# Patient Record
Sex: MALE | Race: BLACK OR AFRICAN AMERICAN | NOT HISPANIC OR LATINO | Employment: OTHER | URBAN - METROPOLITAN AREA
[De-identification: names, ages, dates, MRNs, and addresses within clinical notes are randomized per-mention and may not be internally consistent; named-entity substitution may affect disease eponyms.]

---

## 2019-12-26 ENCOUNTER — TRANSCRIBE ORDERS (OUTPATIENT)
Dept: ADMINISTRATIVE | Facility: HOSPITAL | Age: 54
End: 2019-12-26

## 2019-12-26 DIAGNOSIS — I69.359 HEMIPLEGIA, DOMINANT SIDE S/P CVA (CEREBROVASCULAR ACCIDENT) (HCC): ICD-10-CM

## 2019-12-26 DIAGNOSIS — G47.30 SLEEP APNEA, UNSPECIFIED TYPE: Primary | ICD-10-CM

## 2019-12-26 DIAGNOSIS — G47.00 INSOMNIA WITH SLEEP APNEA: ICD-10-CM

## 2019-12-26 DIAGNOSIS — G47.30 INSOMNIA WITH SLEEP APNEA: ICD-10-CM

## 2020-01-13 ENCOUNTER — HOSPITAL ENCOUNTER (OUTPATIENT)
Dept: NON INVASIVE DIAGNOSTICS | Facility: CLINIC | Age: 55
Discharge: HOME | End: 2020-01-13
Payer: MEDICARE

## 2020-01-13 DIAGNOSIS — I69.359 HEMIPLEGIA, DOMINANT SIDE S/P CVA (CEREBROVASCULAR ACCIDENT) (HCC): ICD-10-CM

## 2020-01-13 PROCEDURE — 93880 EXTRACRANIAL BILAT STUDY: CPT | Performed by: SURGERY

## 2020-01-13 PROCEDURE — 93880 EXTRACRANIAL BILAT STUDY: CPT

## 2020-01-16 ENCOUNTER — TRANSCRIBE ORDERS (OUTPATIENT)
Dept: INTERNAL MEDICINE CLINIC | Facility: CLINIC | Age: 55
End: 2020-01-16

## 2020-01-16 DIAGNOSIS — M21.372 LEFT FOOT DROP: Primary | ICD-10-CM

## 2020-01-17 ENCOUNTER — TRANSCRIBE ORDERS (OUTPATIENT)
Dept: INTERNAL MEDICINE CLINIC | Facility: CLINIC | Age: 55
End: 2020-01-17

## 2020-01-17 DIAGNOSIS — G62.9 NEUROPATHY: Primary | ICD-10-CM

## 2020-01-20 ENCOUNTER — CONSULT (OUTPATIENT)
Dept: MULTI SPECIALTY CLINIC | Facility: CLINIC | Age: 55
End: 2020-01-20

## 2020-01-20 VITALS
DIASTOLIC BLOOD PRESSURE: 82 MMHG | OXYGEN SATURATION: 99 % | HEART RATE: 83 BPM | TEMPERATURE: 98.4 F | SYSTOLIC BLOOD PRESSURE: 116 MMHG

## 2020-01-20 DIAGNOSIS — L85.3 XEROSIS CUTIS: ICD-10-CM

## 2020-01-20 DIAGNOSIS — E11.42 TYPE 2 DIABETES MELLITUS WITH DIABETIC POLYNEUROPATHY, WITHOUT LONG-TERM CURRENT USE OF INSULIN (HCC): ICD-10-CM

## 2020-01-20 DIAGNOSIS — B35.1 ONYCHOMYCOSIS: ICD-10-CM

## 2020-01-20 DIAGNOSIS — M21.372 LEFT FOOT DROP: Primary | ICD-10-CM

## 2020-01-20 PROCEDURE — 99203 OFFICE O/P NEW LOW 30 MIN: CPT | Performed by: PODIATRIST

## 2020-01-20 PROCEDURE — 11721 DEBRIDE NAIL 6 OR MORE: CPT | Performed by: PODIATRIST

## 2020-01-20 RX ORDER — AMMONIUM LACTATE 12 G/100G
LOTION TOPICAL 2 TIMES DAILY PRN
Qty: 400 G | Refills: 0 | Status: SHIPPED | OUTPATIENT
Start: 2020-01-20 | End: 2020-02-15

## 2020-01-20 RX ORDER — CLOPIDOGREL BISULFATE 75 MG/1
75 TABLET ORAL
COMMUNITY

## 2020-01-20 RX ORDER — AMLODIPINE BESYLATE AND BENAZEPRIL HYDROCHLORIDE 5; 20 MG/1; MG/1
CAPSULE ORAL
COMMUNITY
Start: 2019-12-23

## 2020-01-20 RX ORDER — GLIPIZIDE 10 MG/1
10 TABLET ORAL
COMMUNITY

## 2020-01-20 RX ORDER — ATORVASTATIN CALCIUM 40 MG/1
40 TABLET, FILM COATED ORAL DAILY
COMMUNITY
Start: 2019-12-23

## 2020-01-20 RX ORDER — ASPIRIN 81 MG/1
TABLET, COATED ORAL
COMMUNITY
Start: 2019-12-23

## 2020-01-20 RX ORDER — BLOOD-GLUCOSE METER
EACH MISCELLANEOUS
COMMUNITY
Start: 2019-12-30

## 2020-01-20 NOTE — PROGRESS NOTES
Podiatry Clinic Visit  Keena Parks 48 y o  male MRN: [id number]  Encounter: [id number]    Assessment/Plan        Diagnoses and all orders for this visit:    Left foot drop  -     Ambulatory [facility] Podiat[facility]  -     [facility]  -     Diabetic Shoe  -     Diabetic Shoe Inserts    Xerosis cutis  -     ammonium lactate (LAC-HYDRIN) 12 % lotion; Apply topically 2 (two) times a day as needed for dry skin    Type 2 diabetes mellitus with diabetic polyneuropathy, without long-term current use of insulin (HCC)  -     Diabetic Shoe  -     Diabetic Shoe Inserts           Plan:   Patient was seen and examined with all their questions and concerns addressed   DM foot risk assessment was performed, DM risk score 1   Nails x 10 were debrided in length and depth utilizing a nail nipper without incident to patients satisfaction   Rx for [facility] was printed off and handed to the patient along with a list of DME suppliers in the area   Rx for Dm shoes and inserts were printed off and handed to patient   Rx for Amlactin was electronically sent to a pharmacy of his choice   Patient was re-appointed for 2 months    - Dr Marylen Place was available/present for entirety of patient encounter and present for all procedures  History of Present Illness     HPI: Keena Parks is a 48 y o  male who presents as a referral from his PCP  He recently moved to the area from [place] where he was being followed by a podiatrist every 3 months routine nail care  He reports history of CVA with left-sided muscle weakness left foot drop  He also reports that he is a diabetic who is control his blood sugars with only medications  He states that his latest A1c test was 9 %  He denies any history pedal ulceration or calf claudication  They have no further podiatric complaints at this time  Review of Systems   Constitutional: Negative  HENT: Negative  Eyes: Negative  Respiratory: Negative  Cardiovascular: Negative  Gastrointestinal: Negative  Musculoskeletal: as noted in HPI  Skin: Elongated nails  Neurological: Negative  Historical Information   History reviewed  No pertinent past medical history  History reviewed  No pertinent surgical history  Social History   Social History     Substance and Sexual Activity   Alcohol Use Yes     Social History     Substance and Sexual Activity   Drug Use Never     Social History     Tobacco Use   Smoking Status Former Smoker   Smokeless Tobacco Never Used     Family History: History reviewed  No pertinent family history  Meds/Allergies     (Not in a hospital admission)  No Known Allergies    Objective     Current Vitals:   Blood Pressure: 116/82 (01/20/20 1443)  Pulse: 83 (01/20/20 1443)  Temperature: 98 4 °F (36 9 °C) (01/20/20 1443)  Temp Source: Temporal (01/20/20 1443)  SpO2: 99 % (01/20/20 1443)        /82 (BP Location: Left arm, Patient Position: Sitting, Cuff Size: Large)   Pulse 83   Temp 98 4 °F (36 9 °C) (Temporal)   SpO2 99%       Lower Extremity Exam:    Foot Exam    Musculoskeletal:  MMT is 3/5 to all compartments of the LLE, +0/4 edema B/L, Hallux limitus is present  Equinus is present  Decreased pedal arch noted b/l  Vascular:   DP pulses and PT pulses are present bilaterally  , CFT< 3sec to all digits  Pedal hair is Absent  regular rate and rhythm  Dermatological:  No open Lesions  Skin of the LE is normal texture, temperature, turgor  Interdigital maceration is not present  Elongated thick nails with sub ungual debris noted b/l  Dry pedal skin noted b/l  Neurologic:  Gross sensation is intact  Protective sensation is intact on the right diminished on the left  Vibratory sensation is intact and right diminished on the left  Sharp sensation is present on right diminished on the left

## 2020-02-12 ENCOUNTER — OFFICE VISIT (OUTPATIENT)
Dept: SLEEP CENTER | Facility: CLINIC | Age: 55
End: 2020-02-12
Payer: MEDICARE

## 2020-02-12 VITALS
BODY MASS INDEX: 40.43 KG/M2 | HEART RATE: 80 BPM | HEIGHT: 74 IN | SYSTOLIC BLOOD PRESSURE: 144 MMHG | DIASTOLIC BLOOD PRESSURE: 82 MMHG | WEIGHT: 315 LBS

## 2020-02-12 DIAGNOSIS — E66.9 DIABETES MELLITUS TYPE 2 IN OBESE (HCC): ICD-10-CM

## 2020-02-12 DIAGNOSIS — I10 ESSENTIAL HYPERTENSION: ICD-10-CM

## 2020-02-12 DIAGNOSIS — G47.33 OBSTRUCTIVE SLEEP APNEA SYNDROME: Primary | ICD-10-CM

## 2020-02-12 DIAGNOSIS — Z72.821 INADEQUATE SLEEP HYGIENE: ICD-10-CM

## 2020-02-12 DIAGNOSIS — Z86.73 HISTORY OF CVA (CEREBROVASCULAR ACCIDENT): ICD-10-CM

## 2020-02-12 DIAGNOSIS — E66.01 MORBID OBESITY WITH BMI OF 40.0-44.9, ADULT (HCC): ICD-10-CM

## 2020-02-12 DIAGNOSIS — E11.69 DIABETES MELLITUS TYPE 2 IN OBESE (HCC): ICD-10-CM

## 2020-02-12 DIAGNOSIS — R40.0 DAYTIME SLEEPINESS: ICD-10-CM

## 2020-02-12 PROCEDURE — 99204 OFFICE O/P NEW MOD 45 MIN: CPT | Performed by: INTERNAL MEDICINE

## 2020-02-12 NOTE — PROGRESS NOTES
Review of Systems      Genitourinary need to urinate more than twice a night   Cardiology ankle/leg swelling   Gastrointestinal none   Neurology need to move extremities, numbness/tingling of an extremity and balance problems   Constitutional fatigue   Integumentary none   Psychiatry anxiety   Musculoskeletal back pain and leg cramps   Pulmonary snoring and difficulty breathing when lying flat    ENT none   Endocrine excessive thirst   Hematological none

## 2020-02-12 NOTE — PROGRESS NOTES
Consultation - 86 [address] Good  48 y o  male  :1965  ETQ:[id number]    Physician Requesting Consult: Trevon Rodriguez,*     Reason for Consult : At your kind request I saw this patient for initial sleep evaluation today  Was diagnosed with obstructive sleep apnea in the past and prescribed CPAP  He did not initiate therapy because of insurance constraints  He is now here because of ongoing symptoms  Results of prior studies were not available  PFSH, Problem List, Medications & Allergies were reviewed in EMR  He  has no past medical history on file  He has a current medication list which includes the following prescription(s): amlodipine-benazepril, ammonium lactate, aspirin 81, atorvastatin, one touch ultra 2, clopidogrel, and glipizide  HPI:  He sleeps alone but is aware of snoring and awakens himself with snoring  He is not aware of breathing difficulties during sleep but notes snoring is worse when he is more tired  Other Complaints:  Constant fatigue  Restless Leg Syndrome: reports no suggestive symptoms but reports nocturnal leg cramps that awaken him    Parasomnia: no features reported    Sleep Routine:   Typical Bedtime:  11:00 p m  Gets OOB:  6:00 a m  TIB:7 hrs  Sleep latency: upto 60 minutes because he watches TV in bed  He denies racing thoughts of clock watching  Sleep Interruptions:2/nite because of nocturia is able to fall back asleep  Awakens: spontaneously  Upon awakening: never feels rested  He estimates getting 5 hrs sleep  He has Excessive Daytime Sleepiness , yawns, feels like napping but avoids  [place] Sleepiness Scale rated at Total score: 24  Habits: reports that he has never smoked  He has never used smokeless tobacco  , reports that he drinks alcohol  ,  reports that he does not use drugs  ,Caffeine use:rarely , Exercise routine: none   Family History: Negative for sleep disturbance  ROS: reviewed & as attached  Significant for intentional weight loss of around 40 lb in the past year  EXAM:  /82   Pulse 80   Ht 6' 2" (1 88 m)   Wt (!) 152 kg (336 lb)   BMI 43 14 kg/m²    General: Well groomed male, well appearing, in no apparent distress  Psychiatric: Alert and orientated; Cooperative; Speech:clear and coherent; Normal mood, affect & thought   HEENT:  Craniofacial anatomy: normal and macroglossia Sinuses: non- tender  TMJ: Normal    Eyes: EOM's intact;  conjunctiva/corneas clear   Ears: Externallyappear normal     Nasal Airway: is patent Septum:intact; Mucosa: normal; Turbinates: normal; Rhinorrhea: None   Mouth: Lips: normal posture; Dentition: normal   Mucosa:moist  ; Hard Palate:normal    Oropharryx: crowded and AP narrowing Tongue: Mallampati:Class IV and MobileSoft Palate:  redundant  Tonsils: no hypertrophy  Neck: is thick and excess fatty tissue; Neck Circumference: 19 "; Supple; no abnormal masses; Thyroid:normal  Trachea:central     Lymph: No Cervical or Submandibular Lymhadenopathy  Heart: S1,S2 normal; RRR; no gallop; nomurmurs   Lungs: Respiratory Effort:normal  Air entry good bilaterally  No wheezes  No rales  Abdomen: Obese, Soft & non-tender    Extremities: No pedal edema  No clubbing or cyanosis  Skin: warm and dry; Color& Hydration good; no facial rashes or lesions   Neurological: CNII-XII intact; Motor normal; Sensation normal  Musculoskeletal:  Left hemiplegia  Gait:  Hemiplegic with a cane  IMPRESSION: Primary, Secondary Sleep Diagnoses (to Medical or Psych conditions) & Comorbidities   1  Obstructive sleep apnea syndrome  Ambulatory referral to Sleep Medicine    Split Study   2  Daytime sleepiness  Split Study   3  Inadequate sleep hygiene     4  History of CVA (cerebrovascular accident)     5  Essential hypertension     6  Diabetes mellitus type 2 in obese ([facility] [address] )     7  Morbid obesity with BMI of 40 0-44 9, [facility] ([facility] )        PLAN:   1    Comprehensive counseling was provided on pathophysiology, diagnostic strategies & treatment options; effects on symptoms and comorbidities; risks of inadequate therapy; costs and insurance aspects  2  I advised on weight reduction, avoiding sleeping supine, using alcohol or sedating medications close to bed time and on safe driving practices  3  Cognitive behavioral therapy was initiated with advise on Sleep Hygiene and behavioral techniques to manage Insomnia  4  Nocturnal polysomnography is indicated and since he is willing to try CPAP a split study will be scheduled  6  Follow-up will be scheduled after the studies to review results, further details of treatment options and to initiate/adjust therapy  Thank you for allowing me to participate in the care of this patient  I will keep you apprised of developments      Sincerely,     Authenticated electronically by El Travis MD   on 49/47/22   Board Certified Specialist

## 2020-02-13 ENCOUNTER — HOSPITAL ENCOUNTER (OUTPATIENT)
Dept: SLEEP CENTER | Facility: CLINIC | Age: 55
Discharge: HOME | End: 2020-02-13
Payer: MEDICARE

## 2020-02-13 DIAGNOSIS — R40.0 DAYTIME SLEEPINESS: ICD-10-CM

## 2020-02-13 DIAGNOSIS — G47.33 OBSTRUCTIVE SLEEP APNEA SYNDROME: ICD-10-CM

## 2020-02-13 PROCEDURE — 95811 POLYSOM 6/>YRS CPAP 4/> PARM: CPT

## 2020-02-14 DIAGNOSIS — L85.3 XEROSIS CUTIS: ICD-10-CM

## 2020-02-14 DIAGNOSIS — G47.33 OSA (OBSTRUCTIVE SLEEP APNEA): Primary | ICD-10-CM

## 2020-02-14 NOTE — PROGRESS NOTES
Sleep Study Documentation    Pre-Sleep Study       Sleep testing procedure explained to patient:YES    Patient napped prior to study:NO    Caffeine:Dayshift worker after 12PM   Caffeine use:NO    Alcohol:Dayshift workers after 5PM: Alcohol use:NO    Typical day for patient:YES       Study Documentation    Sleep Study Indications: EMRE, Loud snoring, BMI>40, Nocturnal leg cramps, Daytime Sleepiness, History of CVA     Sleep Study: Split Optimal PAP pressure: 9 cm   Leak:Small  Snore:Eliminated  REM Obtained:yes  Supplemental O2: no    Minimum SaO2 88%  Baseline SaO2 97%  PAP mask tried (list all)  Size large ResMed AirFit F20 Full face mask   PAP mask choice (final)  Size large ResMed AirFit F20 full face mask   PAP mask type:full face  PAP pressure at which snoring was eliminated 9 cm   Minimum SaO2 at final PAP pressure 91%  Mode of Therapy:CPAP  TCPCO2:  YES due to BMI>40   CPAP changed to BiPAP:No    Mode of Therapy:CPAP    EKG abnormalities: yes:  EPOCH example and comments: Sinus Arrythmia throughout entire recording - please see epoch 751-124 for multiple P waves noted    EEG abnormalities: no    Sleep Study Recorded < 2 hours: N/A    Sleep Study Recorded > 2 hours but incomplete study: N/A    Sleep Study Recorded 6 hours but no sleep obtained: NO    Patient classification: disabled       Post-Sleep Study    Medication used at bedtime or during sleep study:NO    Patient reports time it took to fall asleep:greater than 60 minutes    Patient reports waking up during study:1 to 2 times  Patient reports returning to sleep without difficulty  Patient reports sleeping 4 to 6 hours without dreaming  Patient reports sleep during study:better than usual    Patient rated sleepiness: Not sleepy or tired    PAP treatment:yes: Post PAP treatment patient reports feeling better and  would wear PAP mask at home

## 2020-02-15 RX ORDER — AMMONIUM LACTATE 12 G/100G
CREAM TOPICAL
Qty: 385 G | Refills: 3 | Status: SHIPPED | OUTPATIENT
Start: 2020-02-15

## 2020-02-17 ENCOUNTER — TELEPHONE (OUTPATIENT)
Dept: SLEEP CENTER | Facility: CLINIC | Age: 55
End: 2020-02-17

## 2020-02-18 ENCOUNTER — OFFICE VISIT (OUTPATIENT)
Dept: GASTROENTEROLOGY | Facility: CLINIC | Age: 55
End: 2020-02-18
Payer: MEDICARE

## 2020-02-18 VITALS
TEMPERATURE: 96.7 F | DIASTOLIC BLOOD PRESSURE: 83 MMHG | HEART RATE: 77 BPM | WEIGHT: 315 LBS | HEIGHT: 74 IN | BODY MASS INDEX: 40.43 KG/M2 | SYSTOLIC BLOOD PRESSURE: 129 MMHG

## 2020-02-18 DIAGNOSIS — Z12.11 SCREENING FOR COLON CANCER: Primary | ICD-10-CM

## 2020-02-18 PROCEDURE — 99203 OFFICE O/P NEW LOW 30 MIN: CPT | Performed by: INTERNAL MEDICINE

## 2020-02-18 RX ORDER — POLYETHYLENE GLYCOL 3350 17 G/17G
POWDER, FOR SOLUTION ORAL
Qty: 238 G | Refills: 0 | Status: SHIPPED | OUTPATIENT
Start: 2020-02-18

## 2020-02-18 NOTE — PROGRESS NOTES
[facility] - Outpatient Consultation  Keena Parks 48 y o  male MRN: [id number]  Encounter: [id number]          ASSESSMENT AND PLAN:        1  Screening for colon cancer  - Colonoscopy; Future  - polyethylene glycol (GLYCOLAX) powder; At 5pm take 5mgx2 dulcolax  At 6pm 32oz miralax in 64oz gatorade  Drink 8oz glass every 5 mins until 32oz finished  Drink remaining as rec  Dispense: 238 g; Refill: 0    Will proceed with screening colonoscopy  The rationale for colonoscopy with possible biopsy, possible polypectomy, with IV sedation as well as all risks, benefits, and alternatives were discussed with the patient in detail  Risks including but not limited to perforation, bleeding, need for blood transfusion or emergent surgery, and missed neoplasm were reviewed in detail with the patient  The patient demonstrates full understanding and wishes to proceed with the colonoscopy   ______________________________________________________________    HPI:  Keena Parks is a 48y o  year old male who presents to the office for evaluation for colorectal cancer screening  Reported symptoms: none  Family history: no known risk factors  Previous colonoscopy: none    No blood in stool  No weight loss  No family history of colon cancer  No change in bowel habits  They are plavix for carotid artery stenosis  He has a history of sleep apnea, diabetes on glipizide well controlled, HTN, HLD  REVIEW OF SYSTEMS:    CONSTITUTIONAL: Denies any fever, chills, rigors, and weight loss  HEENT: No earache or tinnitus  Denies hearing loss or visual disturbances  CARDIOVASCULAR: No chest pain or palpitations  RESPIRATORY: Denies any cough, hemoptysis, shortness of breath or dyspnea on exertion  GASTROINTESTINAL: As noted in the History of Present Illness  GENITOURINARY: No problems with urination  Denies any hematuria or dysuria  NEUROLOGIC: No dizziness or vertigo, denies headaches  MUSCULOSKELETAL: Denies any muscle or joint pain  SKIN: Denies skin rashes or itching  ENDOCRINE: Denies excessive thirst  Denies intolerance to heat or cold  PSYCHOSOCIAL: Denies depression or anxiety  Denies any recent memory loss  Historical Information   History reviewed  No pertinent past medical history  History reviewed  No pertinent surgical history  Social History   Social History     Substance and Sexual Activity   Alcohol Use Yes    Comment: occ     Social History     Substance and Sexual Activity   Drug Use Never     Social History     Tobacco Use   Smoking Status Never Smoker   Smokeless Tobacco Never Used   Tobacco Comment    cigar     History reviewed  No pertinent family history  Meds/Allergies       Current Outpatient Medications:     amLODIPine-benazepril (LOTREL 5-20) 5-20 MG per capsule    ammonium lactate (LAC-HYDRIN) 12 % cream    ASPIRIN 81 81 MG EC tablet    atorvastatin (LIPITOR) 40 mg tablet    Blood Glucose Monitoring Suppl (ONE TOUCH ULTRA 2) w/Device KIT    clopidogrel (PLAVIX) 75 mg tablet    glipiZIDE (GLUCOTROL) 10 mg tablet    No Known Allergies        Objective     Blood pressure 129/83, pulse 77, temperature (!) 96 7 °F (35 9 °C), temperature source Tympanic, height 6' 2" (1 88 m), weight (!) 151 kg (332 lb)  Body mass index is 42 63 kg/m²  PHYSICAL EXAM:      General Appearance:   Alert, cooperative, no distress   HEENT:   Normocephalic, atraumatic, anicteric  Neck:  Supple, symmetrical, trachea midline   Lungs:   Clear to auscultation bilaterally; no rales, rhonchi or wheezing; respirations unlabored    Heart[de-identified]   Regular rate and rhythm; no murmur, rub, or gallop     Abdomen:   Soft, non-tender, non-distended; normal bowel sounds; no masses, no organomegaly    Genitalia:   Deferred    Rectal:   Deferred    Extremities:  No cyanosis, clubbing or edema    Pulses:  2+ and symmetric    Skin:  No jaundice, rashes, or lesions    Lymph nodes:  No palpable cervical lymphadenopathy        Lab Results:   No visits with results within 1 Day(s) from this visit  Latest known visit with results is:   No results found for any previous visit  Radiology Results:   No results found

## 2020-02-18 NOTE — PATIENT INSTRUCTIONS
Colonoscopy scheduled 4/2/20 with Dr Titi Millan at [facility]   Will B gave pt Miralax/Dulcolax instructions during OV

## 2020-03-13 ENCOUNTER — TELEPHONE (OUTPATIENT)
Dept: SLEEP CENTER | Facility: CLINIC | Age: 55
End: 2020-03-13

## 2020-03-31 ENCOUNTER — TELEPHONE (OUTPATIENT)
Dept: GASTROENTEROLOGY | Facility: CLINIC | Age: 55
End: 2020-03-31

## 2020-04-27 ENCOUNTER — TELEMEDICINE (OUTPATIENT)
Dept: SLEEP CENTER | Facility: CLINIC | Age: 55
End: 2020-04-27
Payer: MEDICARE

## 2020-04-27 ENCOUNTER — TELEPHONE (OUTPATIENT)
Dept: GASTROENTEROLOGY | Facility: AMBULARY SURGERY CENTER | Age: 55
End: 2020-04-27

## 2020-04-27 DIAGNOSIS — Z86.73 HISTORY OF CVA (CEREBROVASCULAR ACCIDENT): ICD-10-CM

## 2020-04-27 DIAGNOSIS — E66.01 MORBID OBESITY WITH BMI OF 40.0-44.9, ADULT (HCC): ICD-10-CM

## 2020-04-27 DIAGNOSIS — E11.69 DIABETES MELLITUS TYPE 2 IN OBESE (HCC): ICD-10-CM

## 2020-04-27 DIAGNOSIS — I10 ESSENTIAL HYPERTENSION: ICD-10-CM

## 2020-04-27 DIAGNOSIS — E66.9 DIABETES MELLITUS TYPE 2 IN OBESE (HCC): ICD-10-CM

## 2020-04-27 DIAGNOSIS — G47.33 OSA (OBSTRUCTIVE SLEEP APNEA): Primary | ICD-10-CM

## 2020-04-27 DIAGNOSIS — Z72.821 INADEQUATE SLEEP HYGIENE: ICD-10-CM

## 2020-04-27 DIAGNOSIS — R40.0 DAYTIME SLEEPINESS: ICD-10-CM

## 2020-04-27 PROCEDURE — 99214 OFFICE O/P EST MOD 30 MIN: CPT | Performed by: INTERNAL MEDICINE

## 2020-04-28 ENCOUNTER — TELEPHONE (OUTPATIENT)
Dept: SLEEP CENTER | Facility: CLINIC | Age: 55
End: 2020-04-28

## 2020-04-28 ENCOUNTER — HOSPITAL ENCOUNTER (OUTPATIENT)
Dept: GASTROENTEROLOGY | Facility: HOSPITAL | Age: 55
Setting detail: OUTPATIENT SURGERY
Discharge: HOME | End: 2020-04-28
Attending: INTERNAL MEDICINE

## 2020-06-22 ENCOUNTER — TELEPHONE (OUTPATIENT)
Dept: INTERNAL MEDICINE CLINIC | Facility: CLINIC | Age: 55
End: 2020-06-22

## 2020-07-20 ENCOUNTER — OFFICE VISIT (OUTPATIENT)
Dept: MULTI SPECIALTY CLINIC | Facility: CLINIC | Age: 55
End: 2020-07-20

## 2020-07-20 VITALS
BODY MASS INDEX: 40.43 KG/M2 | HEART RATE: 83 BPM | WEIGHT: 315 LBS | DIASTOLIC BLOOD PRESSURE: 86 MMHG | TEMPERATURE: 96 F | SYSTOLIC BLOOD PRESSURE: 144 MMHG | HEIGHT: 74 IN

## 2020-07-20 DIAGNOSIS — B35.1 ONYCHOMYCOSIS: ICD-10-CM

## 2020-07-20 DIAGNOSIS — E11.42 TYPE 2 DIABETES MELLITUS WITH PERIPHERAL NEUROPATHY (HCC): ICD-10-CM

## 2020-07-20 DIAGNOSIS — M21.372 FOOT DROP, LEFT FOOT: Primary | ICD-10-CM

## 2020-07-20 PROCEDURE — 11721 DEBRIDE NAIL 6 OR MORE: CPT | Performed by: PODIATRIST

## 2020-07-20 PROCEDURE — 99213 OFFICE O/P EST LOW 20 MIN: CPT | Performed by: PODIATRIST

## 2020-07-20 NOTE — PROGRESS NOTES
Podiatry Clinic Visit  Mendel Spar 50 y o  male MRN: [id number]  Encounter: [id number]    Assessment/Plan        Diagnoses and all orders for this visit:    Foot drop, left foot  -     Durable Medical Equipment    Type 2 diabetes mellitus with peripheral neuropathy ([facility] [address] )    Onychomycosis           Plan:   Patient was seen and examined with all their questions and concerns addressed   Patient was given another prescription for a MAFO, as he may have lost his previous perscription  He was educated on how to obtain this brace from a DME supplier   Patient's mycotic nails were sharply debrided x10 using a large nail nipper without incident  All devitalized and infected nail was removed and all subungual debris was cleared   Patient was re-appointed for 3 months but may call earlier if need be  - Dr Asha Oliver was available/present for entirety of patient encounter and present for all procedures  History of Present Illness     HPI: Mendel Spar is a 50 y o  male who presents complaining of elongated, thickened, discolored nails with subungual debris  He also says that his left foot is unable to move secondary to a previous stroke  He was given a prescription last appointment for a brace, but says that he has been unable to obtain this brace due to "car trouble " Patient denies nausea, vomiting, chest pain, shortness of breath, chills, fever  The patient has no further podiatric complaints at this time  Review of Systems   Constitutional: Negative  HENT: Negative  Eyes: Negative  Respiratory: Negative  Cardiovascular: Negative  Gastrointestinal: Negative  Musculoskeletal: negative  Skin: Mycotic toe nails  Neurological: Negative  Historical Information   History reviewed  No pertinent past medical history  History reviewed  No pertinent surgical history    Social History   Social History     Substance and Sexual Activity   Alcohol Use Yes    Comment: occ     Social History     Substance and Sexual Activity   Drug Use Never     Social History     Tobacco Use   Smoking Status Never Smoker   Smokeless Tobacco Never Used   Tobacco Comment    cigar     Family History: History reviewed  No pertinent family history  Meds/Allergies     (Not in a hospital admission)  No Known Allergies    Objective     Current Vitals:   Blood Pressure: 144/86 (07/20/20 1458)  Pulse: 83 (07/20/20 1458)  Temperature: (!) 96 °F (35 6 °C) (07/20/20 1458)  Temp Source: Temporal (07/20/20 1458)  Height: 6' 2" (188 cm) (07/20/20 1458)  Weight - Scale: (!) 153 kg (338 lb 3 oz) (07/20/20 1458)        /86 (BP Location: Right arm, Patient Position: Sitting, Cuff Size: Large)   Pulse 83   Temp (!) 96 °F (35 6 °C) (Temporal)   Ht 6' 2" (1 88 m)   Wt (!) 153 kg (338 lb 3 oz)   BMI 43 42 kg/m²       Lower Extremity Exam:    Foot Exam    Musculoskeletal:  MMT is 5/5 to all compartments of the right LE and 0/5 to all compartments of the left LE, +0/4 edema B/L, Hallux limitus is present  Equinus is present  No pain with passive ROM of pedal joints  Vascular:   DP pulses and PT pulses are present bilaterally  , CFT< 3sec to all digits  Pedal hair is Present  Pulse has regular rate and rhythm  Skin temperature warm to warm B/L  Dermatological:  No open Lesions  Skin of the LE is normal texture, temperature, turgor  Interdigital maceration is not present  Toenails 1-5 b/l are elongated, thickened, discolored, dystrophic, with subungual debris, indicative of onychomycosis  Neurologic:  Gross sensation is intact  Protective sensation is Intact  Vibratory sensation is Intact  Sharp sensation is present

## 2020-07-20 NOTE — PATIENT INSTRUCTIONS
Continue proper diabetic foot care; checking feet every day, wearing white socks, always wearing diabetic shoes even in house, tight glycemic control, proper low-sugar diet and exercise    Please obtain MAFO ankle brace (Molded ankle foot orthosis) from durable medical equipment supplier ([id number]: [facility]st[facility] Orth[facility]; [phone])

## 2020-08-13 ENCOUNTER — OFFICE VISIT (OUTPATIENT)
Dept: ENDOCRINOLOGY | Facility: CLINIC | Age: 55
End: 2020-08-13
Payer: MEDICARE

## 2020-08-13 VITALS
SYSTOLIC BLOOD PRESSURE: 144 MMHG | WEIGHT: 315 LBS | TEMPERATURE: 97.1 F | DIASTOLIC BLOOD PRESSURE: 86 MMHG | BODY MASS INDEX: 42.88 KG/M2 | HEART RATE: 84 BPM

## 2020-08-13 DIAGNOSIS — E11.65 TYPE 2 DIABETES MELLITUS WITH HYPERGLYCEMIA, WITHOUT LONG-TERM CURRENT USE OF INSULIN (HCC): Primary | ICD-10-CM

## 2020-08-13 DIAGNOSIS — G47.33 OBSTRUCTIVE SLEEP APNEA SYNDROME: ICD-10-CM

## 2020-08-13 DIAGNOSIS — I10 ESSENTIAL HYPERTENSION: ICD-10-CM

## 2020-08-13 DIAGNOSIS — E55.9 VITAMIN D DEFICIENCY: ICD-10-CM

## 2020-08-13 DIAGNOSIS — E78.2 MIXED HYPERLIPIDEMIA: ICD-10-CM

## 2020-08-13 PROCEDURE — 99204 OFFICE O/P NEW MOD 45 MIN: CPT | Performed by: INTERNAL MEDICINE

## 2020-08-13 RX ORDER — DULAGLUTIDE 0.75 MG/.5ML
0.75 INJECTION, SOLUTION SUBCUTANEOUS WEEKLY
Qty: 4 PEN | Refills: 3 | Status: SHIPPED | OUTPATIENT
Start: 2020-08-13 | End: 2021-01-13

## 2020-08-13 RX ORDER — METFORMIN HYDROCHLORIDE 500 MG/1
500 TABLET, EXTENDED RELEASE ORAL
Qty: 30 TABLET | Refills: 4 | Status: SHIPPED | OUTPATIENT
Start: 2020-08-13 | End: 2020-12-14

## 2020-08-13 NOTE — PATIENT INSTRUCTIONS
Check blood sugar twice daily, before breakfast and before dinner 1 day, and next day before lunch and at bedtime, and alternate every other day  Goal for blood sugar is 100 to 160 mg/dL  Send log every 2 weeks to make adjustment  Please schedule appointment with diabetes education  Stop Januvia once you start Trulicity inj       Hypoglycemia instructions   Ani Puentes  8/13/2020  [id number]    Low Blood Sugar    Steps to treat low blood sugar  1  Test blood sugar if you have symptoms of low blood sugar:   Low Blood Sugar Symptoms:  o Sweaty  o Dizzy  o Rapid heartbeat  o Shaky    o Bad mood  o Hungry      2  Treat blood sugar less than 70 with 15 grams of fast-acting carbohydrate:   Examples of 15 grams Fast-Acting Carbohydrate:  o 4 oz juice  o 4 oz regular soda  o 3-4 glucose tablets (chew)  o 3-4 hard candies (chew)              3    Wait 15 minutes and test your blood sugar again           4   If blood sugar is less than 100, repeat steps 2-3       5  When your blood sugar is 100 or more, eat a snack if it will be longer than one hour until your next meal  The snack should be 15 grams of carbohydrate and a protein:   Examples of snacks:  o ½ sandwich  o 6 crackers with cheese  o Piece of fruit with cheese or peanut butter  o 6 crackers with peanut butter

## 2020-08-13 NOTE — PROGRESS NOTES
Carter Sibley 50 y o  male MRN: [id number]    Encounter: [id number]      Assessment/Plan     Assessment: This is a 50y o -year-old male with diabetes with hyperglycemia  Plan:    Diagnoses and all orders for this visit:    Type 2 diabetes mellitus with hyperglycemia, without long-term current use of insulin ([facility])  A1c is more than 14%, suggestive of severe hyperglycemia  Discussed about starting insulin regimen, however patient is refusing to take insulin, he stated if his going to make changes in his diet and take his medications regularly and if does not improve blood sugars is agreeable to take insulin during his next appointment   He has agreed to take Trulicity 9 68 mg once a week, discussed to stop Januvia  Pen teaching was done in office  Discussed to check blood sugars 3 times daily, and send log in 2 weeks  Also discussed if blood sugar is consistently above 400, he should go to the ER  Discussed to start metformin 500 mg with breakfast, he is very sensitive to metformin, that is why we have started metformin extended release, if he continues to have diarrhea stopped  Will refer him for diabetes education, nutrition counseling  -     Dulaglutide (Trulicity) 9 43 YH/7 5XY SOPN; Inject 0 5 mL (0 75 mg total) under the skin once a week  -     Ambulatory referral to Diabetic Education; Future  -     metFORMIN (GLUCOPHAGE-XR) 500 mg 24 hr tablet; Take 1 tablet (500 mg total) by mouth daily with breakfast  -     Basic metabolic panel; Future  -     C-peptide; Future  -     Microalbumin / creatinine urine ratio; Future    Mixed hyperlipidemia    Continue statins  -     Lipid panel Lab Collect Lab Collect; Future    Essential hypertension  Continue current management for hypertension  Obstructive sleep apnea syndrome  Discussed importance of using CPAP machine regularly  Vitamin D deficiency  Start vitamin-D supplementation 4000 International Units daily  -     Vitamin D 25 hydroxy;  Future        CC: Diabetes    History of Present Illness     HPI:      Dana Dukes is 50 yr old gentleman with medical history of  type 2 diabetes for past 8 years, history of stroke, history of sleep apnea, hyperlipidemia and hypertension is here for establishment of visit for type 2 diabetes and management  Diabetes course over the years is unstable    Currently his taking Glucotrol 10 mg in the morning with breakfast   Patient admits noncompliance to diet  He stated he was treated with insulin previously however, now he is against taking insulin regimen  he was taking metformin previously however he had side effects such as stomach pains and diarrhea  He is currently taking Januvia 100 mg daily, denies side effects to Januvia  Check ing blood sugars in AM before breakfast   Blood sugars are in 140- 150 mg/dl as per patient report, however A1c in the office is more than 14%    He admits following low carb diet     Reviewed blood work results  Glucose 274, BUN 9 creatinine 0 94 sodium 136 potassium 4 4 calcium 9 0 bicarb 26 albumin 3 3 alkaline phosphatase 86 AST 17 ALT 29 anion gap is 7, GFR is 92    Wt Readings from Last 3 Encounters:   08/13/20 (!) 152 kg (334 lb)   07/20/20 (!) 153 kg (338 lb 3 oz)   02/18/20 (!) 151 kg (332 lb)       Review of Systems   Constitutional: Positive for activity change  Negative for diaphoresis, fatigue, fever and unexpected weight change  HENT: Negative  Eyes: Negative for visual disturbance  Respiratory: Negative for cough, chest tightness and shortness of breath  Cardiovascular: Negative for chest pain, palpitations and leg swelling  Gastrointestinal: Positive for diarrhea  Negative for abdominal pain, constipation, nausea and vomiting  Endocrine: Negative for cold intolerance, heat intolerance, polydipsia, polyphagia and polyuria  Genitourinary: Negative for dysuria, enuresis, frequency and urgency  Musculoskeletal: Positive for myalgias  Negative for arthralgias  Fermin Louis(Resident) Skin: Negative for pallor, rash and wound  Allergic/Immunologic: Negative  Neurological: Negative for dizziness, tremors, weakness and numbness  Hematological: Negative  Psychiatric/Behavioral: Negative  Historical Information   History reviewed  No pertinent past medical history  History reviewed  No pertinent surgical history  Social History   Social History     Substance and Sexual Activity   Alcohol Use Yes    Comment: occ     Social History     Substance and Sexual Activity   Drug Use Never     Social History     Tobacco Use   Smoking Status Never Smoker   Smokeless Tobacco Never Used   Tobacco Comment    cigar     Family History: History reviewed  No pertinent family history  Meds/Allergies   Current Outpatient Medications   Medication Sig Dispense Refill    amLODIPine-benazepril (LOTREL 5-20) 5-20 MG per capsule TAKE 1 CAPSULE BY ORAL ROUTE ONCE DAILY      ammonium lactate (LAC-HYDRIN) 12 % cream APPLY TOPICALLY 2 (TWO) TIMES A DAY AS NEEDED FOR DRY SKIN 385 g 3    ASPIRIN 81 81 MG EC tablet TAKE 1 TABLET (81 MG) BY ORAL ROUTE ONCE DAILY      atorvastatin (LIPITOR) 40 mg tablet Take 40 mg by mouth daily      Blood Glucose Monitoring Suppl (ONE TOUCH ULTRA 2) w/Device KIT TEST BLOOD SUGAR LEVELS ONE TIME A DAY      clopidogrel (PLAVIX) 75 mg tablet Take 75 mg by mouth      glipiZIDE (GLUCOTROL) 10 mg tablet Take 10 mg by mouth      polyethylene glycol (GLYCOLAX) powder At 5pm take 5mgx2 dulcolax  At 6pm 32oz miralax in 64oz gatorade  Drink 8oz glass every 5 mins until 32oz finished  Drink remaining as rec  238 g 0    Dulaglutide (Trulicity) 1 12 MX/5 7IT SOPN Inject 0 5 mL (0 75 mg total) under the skin once a week 4 pen 3    metFORMIN (GLUCOPHAGE-XR) 500 mg 24 hr tablet Take 1 tablet (500 mg total) by mouth daily with breakfast 30 tablet 4     No current facility-administered medications for this visit        No Known Allergies    Objective   Vitals: Blood pressure 144/86, pulse 84, temperature (!) 97 1 °F (36 2 °C), weight (!) 152 kg (334 lb)  Physical Exam  Vitals signs reviewed  Constitutional:       General: He is not in acute distress  Appearance: He is well-developed  HENT:      Head: Normocephalic and atraumatic  Mouth/Throat:      Mouth: Mucous membranes are moist    Eyes:      Extraocular Movements: Extraocular movements intact  Pupils: Pupils are equal, round, and reactive to light  Neck:      Musculoskeletal: Normal range of motion and neck supple  Thyroid: No thyromegaly  Cardiovascular:      Rate and Rhythm: Normal rate and regular rhythm  Heart sounds: Normal heart sounds  Pulmonary:      Effort: Pulmonary effort is normal  No respiratory distress  Breath sounds: Normal breath sounds  Abdominal:      General: There is no distension  Palpations: Abdomen is soft  Musculoskeletal: Normal range of motion  General: No deformity  Skin:     General: Skin is warm and dry  Findings: No erythema  Neurological:      Mental Status: He is alert and oriented to person, place, and time  The history was obtained from the review of the chart, patient  Lab Results:            Imaging Studies: I have personally reviewed pertinent reports  Portions of the record may have been created with voice recognition software  Occasional wrong word or "sound a like" substitutions may have occurred due to the inherent limitations of voice recognition software  Read the chart carefully and recognize, using context, where substitutions have occurred

## 2020-09-08 ENCOUNTER — TELEPHONE (OUTPATIENT)
Dept: GASTROENTEROLOGY | Facility: AMBULARY SURGERY CENTER | Age: 55
End: 2020-09-08

## 2020-09-20 PROBLEM — I73.9 PAD (PERIPHERAL ARTERY DISEASE) (HCC): Status: ACTIVE | Noted: 2020-09-20

## 2020-09-20 PROBLEM — R29.898 WEAKNESS OF EXTREMITY: Status: ACTIVE | Noted: 2020-09-20

## 2020-09-20 PROBLEM — E11.9 DM (DIABETES MELLITUS), TYPE 2 (HCC): Status: ACTIVE | Noted: 2020-09-20

## 2020-09-20 PROBLEM — E10.9 DIABETES MELLITUS TYPE 1 (HCC): Status: ACTIVE | Noted: 2020-09-20

## 2020-09-20 PROBLEM — I63.9 CVA (CEREBRAL VASCULAR ACCIDENT) (HCC): Status: ACTIVE | Noted: 2020-09-20

## 2020-09-20 PROBLEM — I65.29 CAROTID STENOSIS: Status: ACTIVE | Noted: 2020-09-20

## 2020-09-20 PROBLEM — E66.01 MORBID OBESITY WITH BMI OF 40.0-44.9, ADULT (HCC): Status: ACTIVE | Noted: 2020-09-20

## 2020-09-20 PROBLEM — E78.5 HYPERLIPIDEMIA: Status: ACTIVE | Noted: 2020-09-20

## 2020-09-21 ENCOUNTER — HOSPITAL ENCOUNTER (OUTPATIENT)
Dept: GASTROENTEROLOGY | Facility: AMBULARY SURGERY CENTER | Age: 55
Setting detail: OUTPATIENT SURGERY
Discharge: HOME | End: 2020-09-21
Attending: INTERNAL MEDICINE

## 2020-11-03 ENCOUNTER — OFFICE VISIT (OUTPATIENT)
Dept: MULTI SPECIALTY CLINIC | Facility: CLINIC | Age: 55
End: 2020-11-03

## 2020-11-03 VITALS
HEART RATE: 73 BPM | BODY MASS INDEX: 44.17 KG/M2 | SYSTOLIC BLOOD PRESSURE: 138 MMHG | TEMPERATURE: 96.7 F | WEIGHT: 315 LBS | DIASTOLIC BLOOD PRESSURE: 78 MMHG | OXYGEN SATURATION: 98 %

## 2020-11-03 DIAGNOSIS — B35.1 ONYCHOMYCOSIS: ICD-10-CM

## 2020-11-03 DIAGNOSIS — E11.65 UNCONTROLLED TYPE 2 DIABETES MELLITUS WITH HYPERGLYCEMIA (HCC): Primary | ICD-10-CM

## 2020-11-03 PROCEDURE — 99212 OFFICE O/P EST SF 10 MIN: CPT | Performed by: PODIATRIST

## 2020-11-03 PROCEDURE — 11721 DEBRIDE NAIL 6 OR MORE: CPT | Performed by: PODIATRIST

## 2020-11-05 DIAGNOSIS — E11.65 TYPE 2 DIABETES MELLITUS WITH HYPERGLYCEMIA, WITHOUT LONG-TERM CURRENT USE OF INSULIN (HCC): Primary | ICD-10-CM

## 2020-11-05 RX ORDER — PEN NEEDLE, DIABETIC 31 G X1/4"
NEEDLE, DISPOSABLE MISCELLANEOUS
Qty: 100 EACH | Refills: 0 | Status: SHIPPED | OUTPATIENT
Start: 2020-11-05 | End: 2020-11-30

## 2020-11-30 DIAGNOSIS — E11.65 TYPE 2 DIABETES MELLITUS WITH HYPERGLYCEMIA, WITHOUT LONG-TERM CURRENT USE OF INSULIN (HCC): ICD-10-CM

## 2020-11-30 RX ORDER — PEN NEEDLE, DIABETIC 31 G X1/4"
NEEDLE, DISPOSABLE MISCELLANEOUS
Qty: 100 EACH | Refills: 0 | Status: SHIPPED | OUTPATIENT
Start: 2020-11-30 | End: 2020-12-28

## 2020-12-12 DIAGNOSIS — E11.65 TYPE 2 DIABETES MELLITUS WITH HYPERGLYCEMIA, WITHOUT LONG-TERM CURRENT USE OF INSULIN (HCC): ICD-10-CM

## 2020-12-14 RX ORDER — METFORMIN HYDROCHLORIDE 500 MG/1
500 TABLET, EXTENDED RELEASE ORAL
Qty: 30 TABLET | Refills: 4 | Status: SHIPPED | OUTPATIENT
Start: 2020-12-14 | End: 2021-03-19

## 2020-12-26 DIAGNOSIS — E11.65 TYPE 2 DIABETES MELLITUS WITH HYPERGLYCEMIA, WITHOUT LONG-TERM CURRENT USE OF INSULIN (HCC): ICD-10-CM

## 2020-12-28 RX ORDER — PEN NEEDLE, DIABETIC 31 G X1/4"
NEEDLE, DISPOSABLE MISCELLANEOUS
Qty: 100 EACH | Refills: 0 | Status: SHIPPED | OUTPATIENT
Start: 2020-12-28 | End: 2021-02-19

## 2021-01-13 DIAGNOSIS — E11.65 TYPE 2 DIABETES MELLITUS WITH HYPERGLYCEMIA, WITHOUT LONG-TERM CURRENT USE OF INSULIN (HCC): ICD-10-CM

## 2021-01-13 RX ORDER — DULAGLUTIDE 0.75 MG/.5ML
0.75 INJECTION, SOLUTION SUBCUTANEOUS WEEKLY
Qty: 12 PEN | Refills: 0 | Status: SHIPPED | OUTPATIENT
Start: 2021-01-13 | End: 2021-01-14 | Stop reason: SDUPTHER

## 2021-01-14 DIAGNOSIS — E11.65 TYPE 2 DIABETES MELLITUS WITH HYPERGLYCEMIA, WITHOUT LONG-TERM CURRENT USE OF INSULIN (HCC): ICD-10-CM

## 2021-01-14 RX ORDER — DULAGLUTIDE 0.75 MG/.5ML
0.75 INJECTION, SOLUTION SUBCUTANEOUS WEEKLY
Qty: 4 PEN | Refills: 3 | Status: SHIPPED | OUTPATIENT
Start: 2021-01-14 | End: 2021-06-19

## 2021-01-14 NOTE — TELEPHONE ENCOUNTER
New prescription according to [facility] will only allow 4 pens per 28 days

## 2021-02-08 ENCOUNTER — TRANSCRIBE ORDERS (OUTPATIENT)
Dept: INTERNAL MEDICINE CLINIC | Facility: CLINIC | Age: 56
End: 2021-02-08

## 2021-02-08 DIAGNOSIS — E11.9 TYPE 2 DIABETES MELLITUS WITHOUT COMPLICATIONS (HCC): Primary | ICD-10-CM

## 2021-02-09 ENCOUNTER — OFFICE VISIT (OUTPATIENT)
Dept: MULTI SPECIALTY CLINIC | Facility: CLINIC | Age: 56
End: 2021-02-09

## 2021-02-09 VITALS — DIASTOLIC BLOOD PRESSURE: 93 MMHG | SYSTOLIC BLOOD PRESSURE: 142 MMHG | TEMPERATURE: 98.3 F | HEART RATE: 75 BPM

## 2021-02-09 DIAGNOSIS — E11.9 TYPE 2 DIABETES MELLITUS WITHOUT COMPLICATION, WITHOUT LONG-TERM CURRENT USE OF INSULIN (HCC): ICD-10-CM

## 2021-02-09 DIAGNOSIS — E11.9 TYPE 2 DIABETES MELLITUS WITHOUT COMPLICATIONS (HCC): Primary | ICD-10-CM

## 2021-02-09 DIAGNOSIS — B35.1 ONYCHOMYCOSIS: ICD-10-CM

## 2021-02-09 PROCEDURE — 99213 OFFICE O/P EST LOW 20 MIN: CPT | Performed by: PODIATRIST

## 2021-02-09 PROCEDURE — 11721 DEBRIDE NAIL 6 OR MORE: CPT | Performed by: PODIATRIST

## 2021-02-09 NOTE — PATIENT INSTRUCTIONS
Foot Care for People with Diabetes   WHAT YOU NEED TO KNOW:   · Foot care helps protect your feet and prevent foot ulcers or sores  Long-term high blood sugar levels can damage the blood vessels and nerves in your legs and feet  This damage makes it hard to feel pressure, pain, temperature, and touch  You may not be able to feel a cut or sore, or shoes that are too tight  Foot care is needed to prevent serious problems, such as an infection or amputation  · Diabetes may cause your toes to become crooked or curved under  These changes may affect the way you walk and can lead to increased pressure on your foot  The pressure can decrease blood flow to your feet  Lack of blood flow increases your risk for a foot ulcer  Do not ignore small problems, such as dry skin or small wounds  These can become life-threatening over time without proper care  DISCHARGE INSTRUCTIONS:   Call your care team provider if:   · Your feet become numb, weak, or hard to move  · You have pus draining from a sore on your foot  · You have a wound on your foot that gets bigger, deeper, or does not heal      · You see blisters, cuts, scratches, calluses, or sores on your foot  · You have a fever, and your feet become red, warm, and swollen  · Your toenails become thick, curled, or yellow  · You find it hard to check your feet because your vision is poor  · You have questions or concerns about your condition or care  Foot care:   · Check your feet each day  Look at your whole foot, including the bottom, and between and under your toes  Check for wounds, corns, and calluses  Use a mirror to see the bottom of your feet  The skin on your feet may be shiny, tight, or darker than normal  Your feet may also be cold and pale  Feel your feet by running your hands along the tops, bottoms, sides, and between your toes  Redness, swelling, and warmth are signs of blood flow problems that can lead to a foot ulcer   Do not try to remove corns or calluses yourself  · Wash your feet each day with soap and warm water  Do not use hot water, because this can injure your foot  Dry your feet gently with a towel after you wash them  Dry between and under your toes  · Apply lotion or a moisturizer on your dry feet  Ask your care team provider what lotions are best to use  Do not put lotion or moisturizer between your toes  Moisture between your toes could lead to skin breakdown  · Cut your toenails correctly  File or cut your toenails straight across  Use a soft brush to clean around your toenails  If your toenails are very thick, you may need to have a care team provider or specialist cut them  · Protect your feet  Do not walk barefoot or wear your shoes without socks  Check your shoes for rocks or other objects that can hurt your feet  Wear cotton socks to help keep your feet dry  Wear socks without toe seams, or wear them with the seams inside out  Change your socks each day  Do not wear socks that are dirty or damp  · Wear shoes that fit well  Wear shoes that do not rub against any area of your feet  Your shoes should be ½ to ¾ inch (1 to 2 centimeters) longer than your feet  Your shoes should also have extra space around the widest part of your feet  Walking or athletic shoes with laces or straps that adjust are best  Ask your care team provider for help to choose shoes that fit you best  Ask him or her if you need to wear an insert, orthotic, or bandage on your feet  · Do not smoke  Smoking can damage your blood vessels and put you at increased risk for foot ulcers  Ask your care team provider for information if you currently smoke and need help to quit  E-cigarettes or smokeless tobacco still contain nicotine  Talk to your care team provider before you use these products  Follow up with your diabetes care team provider or foot specialist as directed:   You will need to have your feet checked at least once a charleen Blanchard 25 may need a foot exam more often if you have nerve damage, foot deformities, or ulcers  Write down your questions so you remember to ask them during your visits  © Copyright [address] Information is for End User's use only and may not be sold, redistributed or otherwise used for commercial purposes  All illustrations and images included in CareNotes® are the copyrighted property of A D A M [employer] Inc  or [facility] Luis M Barclay   The above information is an  only  It is not intended as medical advice for individual conditions or treatments  Talk to your doctor, nurse or pharmacist before following any medical regimen to see if it is safe and effective for you

## 2021-02-09 NOTE — PROGRESS NOTES
At [address] [facility] 50 y o  male MRN: [id number]  Encounter: [id number]      Assessment/Plan        Diagnoses and all orders for this visit:    Type 2 diabetes mellitus without complications ([facility] [address] )  -     Ambulatory referral to Podiatry    Onychomycosis           Plan:   Patient was seen/examined  All questions and concerns addressed   Nails x10 were sharply trimmed to normal length and thickness with a large nail nipper without incident   Stressed the importance of glycemic control, shoe gear, and proper diabetic foot care   RTC in 3 month(s)    Dr Sadia Rodriguez was present during this entire procedure  History of Present Illness     HPI:  Bradly Sharp is a 50 y o  male who presents with elongated toenails and diabetic foot care    States that their nails are painful, elongated  They have difficulty applying their socks and shoes  The pressure within their shoe gear is painful and they have been unable to cut their nails adequately  Patient reports numbness/tingling  Pt denies any hx of wound to lower extremity  They state their last blood glucose level was 152mg/dL  Pt reports that he has left side weakness due to a stroke  The patient denies any nausea, vomiting, fever, chills, shortness of breath, or chest pains  Review of Systems   Constitutional: Negative  HENT: Negative  Eyes: Negative  Respiratory: Negative  Cardiovascular: Negative  Gastrointestinal: Negative  Musculoskeletal: Negative   Skin: elongated thickened toenails  Neurological: Negative  Historical Information   No past medical history on file  No past surgical history on file    Social History   Social History     Substance and Sexual Activity   Alcohol Use Yes    Comment: occ     Social History     Substance and Sexual Activity   Drug Use Never     Social History     Tobacco Use   Smoking Status Never Smoker   Smokeless Tobacco Never Used   Tobacco Comment    cigar     Family History: No family history on file  Meds/Allergies   (Not in a hospital admission)    No Known Allergies    Objective     Current Vitals:   Blood Pressure: 142/93 (02/09/21 1009)  Pulse: 75 (02/09/21 1009)  Temperature: 98 3 °F (36 8 °C) (02/09/21 1009)  Temp Source: Temporal (02/09/21 1009)        /93 (BP Location: Right arm, Patient Position: Sitting, Cuff Size: Large)   Pulse 75   Temp 98 3 °F (36 8 °C) (Temporal)       Lower Extremity Exam:    Diabetic Foot Exam    Musculoskeletal:  MMT is 5/5 to all compartments of the RLE, 3/5 to left lower extremity +2/4 edema B/L, Hallux limitus is present  Equinus is present  Vascular:   DP and PT are non palpable but biphasic by doppler  , CFT< 3sec to all digits  Pedal hair is Absent  Skin:  No open Lesions  Skin of the LE is normal texture, temperature, turgor  Interdigital maceration is not present  Nails elongated and thickened with subungual debris x10  Xerotic skin is noted of the dorsum and plantar surface of the feet bilaterally  Neurologic:  Gross sensation is intact  Protective sensation is Absent to left lower extremity, right is intact  Sharp sensation is present

## 2021-02-19 DIAGNOSIS — E11.65 TYPE 2 DIABETES MELLITUS WITH HYPERGLYCEMIA, WITHOUT LONG-TERM CURRENT USE OF INSULIN (HCC): ICD-10-CM

## 2021-02-19 RX ORDER — PEN NEEDLE, DIABETIC 31 G X1/4"
NEEDLE, DISPOSABLE MISCELLANEOUS
Qty: 100 EACH | Refills: 0 | Status: SHIPPED | OUTPATIENT
Start: 2021-02-19 | End: 2021-03-15

## 2021-03-13 DIAGNOSIS — E11.65 TYPE 2 DIABETES MELLITUS WITH HYPERGLYCEMIA, WITHOUT LONG-TERM CURRENT USE OF INSULIN (HCC): ICD-10-CM

## 2021-03-15 RX ORDER — BLOOD-GLUCOSE METER
EACH MISCELLANEOUS
Qty: 100 EACH | Refills: 0 | Status: SHIPPED | OUTPATIENT
Start: 2021-03-15 | End: 2021-03-23

## 2021-03-19 DIAGNOSIS — E11.65 TYPE 2 DIABETES MELLITUS WITH HYPERGLYCEMIA, WITHOUT LONG-TERM CURRENT USE OF INSULIN (HCC): ICD-10-CM

## 2021-03-19 RX ORDER — METFORMIN HYDROCHLORIDE 500 MG/1
500 TABLET, EXTENDED RELEASE ORAL
Qty: 30 TABLET | Refills: 4 | Status: SHIPPED | OUTPATIENT
Start: 2021-03-19

## 2021-03-23 DIAGNOSIS — E11.65 TYPE 2 DIABETES MELLITUS WITH HYPERGLYCEMIA, WITHOUT LONG-TERM CURRENT USE OF INSULIN (HCC): ICD-10-CM

## 2021-03-23 RX ORDER — PEN NEEDLE, DIABETIC 31 G X1/4"
NEEDLE, DISPOSABLE MISCELLANEOUS
Qty: 100 EACH | Refills: 0 | Status: SHIPPED | OUTPATIENT
Start: 2021-03-23 | End: 2021-05-08

## 2021-05-08 DIAGNOSIS — E11.65 TYPE 2 DIABETES MELLITUS WITH HYPERGLYCEMIA, WITHOUT LONG-TERM CURRENT USE OF INSULIN (HCC): ICD-10-CM

## 2021-05-08 RX ORDER — PEN NEEDLE, DIABETIC 31 G X1/4"
NEEDLE, DISPOSABLE MISCELLANEOUS
Qty: 100 EACH | Refills: 0 | Status: SHIPPED | OUTPATIENT
Start: 2021-05-08 | End: 2021-06-07

## 2021-05-10 ENCOUNTER — OFFICE VISIT (OUTPATIENT)
Dept: MULTI SPECIALTY CLINIC | Facility: CLINIC | Age: 56
End: 2021-05-10

## 2021-05-10 VITALS
BODY MASS INDEX: 40.43 KG/M2 | DIASTOLIC BLOOD PRESSURE: 104 MMHG | TEMPERATURE: 97.5 F | HEART RATE: 76 BPM | HEIGHT: 74 IN | SYSTOLIC BLOOD PRESSURE: 159 MMHG | WEIGHT: 315 LBS

## 2021-05-10 DIAGNOSIS — B35.1 ONYCHOMYCOSIS: ICD-10-CM

## 2021-05-10 DIAGNOSIS — E11.42 TYPE 2 DIABETES MELLITUS WITH DIABETIC POLYNEUROPATHY, WITHOUT LONG-TERM CURRENT USE OF INSULIN (HCC): Primary | ICD-10-CM

## 2021-05-10 DIAGNOSIS — B35.3 TINEA PEDIS OF BOTH FEET: ICD-10-CM

## 2021-05-10 PROCEDURE — 11721 DEBRIDE NAIL 6 OR MORE: CPT | Performed by: PODIATRIST

## 2021-05-10 PROCEDURE — 99213 OFFICE O/P EST LOW 20 MIN: CPT | Performed by: PODIATRIST

## 2021-05-10 RX ORDER — KETOCONAZOLE 20 MG/G
CREAM TOPICAL DAILY
Qty: 60 G | Refills: 1 | Status: SHIPPED | OUTPATIENT
Start: 2021-05-10

## 2021-05-10 NOTE — PATIENT INSTRUCTIONS
Diabetes and Your Skin   WHAT YOU NEED TO KNOW:   Diabetes can affect every part of your body, including your skin  Diabetes that is not well controlled can damage blood vessels and nerves  Damage to blood vessels can make it hard for blood to flow to tissues and organs  A lack of blood flow to your skin can cause ulcers that are difficult to heal  Skin ulcers are also called sores  People with diabetes can also have more bacterial skin infections than other people  Most skin conditions can be prevented with good blood sugar control  Skin sores can be hard to heal, or become life or limb-threatening, if not treated early  DISCHARGE INSTRUCTIONS:   Call your doctor or care team provider if:   · You get a severe burn or cut  · You have a sore that is painful, warm to the touch, or has redness around it  · Your sore does not get better or seems to get worse  · You have a fever  · Your blood sugar levels continue to be higher than they should be  · You have questions or concerns about your condition or care  Prevent skin sores:   · Keep your blood sugars within target range  Your care team provider will tell you what your blood sugar levels should be  High blood sugar levels increase your risk for skin infections and poor wound healing  · Keep your skin clean  Do not take hot baths or showers  They can cause your skin to get dry  Do not take a bubble bath if you have dry skin  Use moisturizing soaps  · Keep your skin from becoming too dry  Apply moisturizing lotion after baths or showers, especially in cold, dry weather  When you scratch dry, itchy skin, you can cause your skin to be open to infection  Bathe less during cold weather and use lotion to moisturize  Do not put lotion between your toes  Moisture between your toes could lead to skin breakdown  Use a humidifier to keep air in your home from being dry  · Keep areas where skin touches skin dry    Use talcum powder in areas such as armpits and groin  You may also need it under your breasts, and between your toes  Moisture in these areas can cause a fungal infection  · Treat cuts immediately  Clean minor cuts with soap and water  Cover them with sterile gauze  Follow up with your doctor or diabetes care team providers as directed:  Write down your questions so you remember to ask them during your visits  © Copyright [address] Information is for End User's use only and may not be sold, redistributed or otherwise used for commercial purposes  All illustrations and images included in CareNotes® are the copyrighted property of [employer]  or [address] [place] [address]  The above information is an  only  It is not intended as medical advice for individual conditions or treatments  Talk to your doctor, nurse or pharmacist before following any medical regimen to see if it is safe and effective for you

## 2021-05-10 NOTE — PROGRESS NOTES
At [address] [facility] 50 y o  male MRN: [id number]  Encounter: [id number]      Assessment/Plan        Diagnoses and all orders for this visit:    Type 2 diabetes mellitus with diabetic polyneuropathy, without long-term current use of insulin ([facility])    Tinea pedis of both feet  -     ketoconazole (NIZORAL) 2 % cream; Apply topically daily    Onychomycosis       Plan:   Patient was seen/examined  All questions and concerns addressed   Nails x10 were sharply trimmed to normal length and thickness with a large nail nipper without incident   Ketoconazole was prescribed for plantar foot tinea pedis   Stressed the importance of glycemic control, shoe gear, and proper diabetic foot care   RTC in 3 month(s)    Dr Alyssia Fulton was present during this entire procedure  History of Present Illness     HPI:  Sukumar Noble is a 50 y o  male who presents with elongated toenails and type 2 diabetes  States that their nails are painful, elongated  They have difficulty applying their socks and shoes  The pressure within their shoe gear is painful and they have been unable to cut their nails adequately  Patient admits to numbness/tingling, but states that it is improved since starting Lyrica  They state their last blood glucose level was "good"  The patient denies any nausea, vomiting, fever, chills, shortness of breath, or chest pains  Review of Systems   Constitutional: Negative  HENT: Negative  Eyes: Negative  Respiratory: Negative  Cardiovascular: Negative  Gastrointestinal: Negative  Musculoskeletal: Negative   Skin: elongated thickened toenails  Erythematous, peeling, scaly, pruritic pedal skin  Neurological: Negative  Historical Information   History reviewed  No pertinent past medical history  History reviewed  No pertinent surgical history    Social History   Social History     Substance and Sexual Activity   Alcohol Use Yes    Comment: occ     Social History Substance and Sexual Activity   Drug Use Never     Social History     Tobacco Use   Smoking Status Never Smoker   Smokeless Tobacco Never Used   Tobacco Comment    cigar     Family History: History reviewed  No pertinent family history  Meds/Allergies   (Not in a hospital admission)    No Known Allergies    Objective     Current Vitals:   Blood Pressure: (!) 159/104 (05/10/21 1319)  Pulse: 76 (05/10/21 1319)  Temperature: 97 5 °F (36 4 °C) (05/10/21 1319)  Temp Source: Temporal (05/10/21 1319)  Height: 6' 2" (188 cm) (05/10/21 1319)  Weight - Scale: (!) 154 kg (340 lb) (05/10/21 1319)  BP (!) 159/104 (BP Location: Right arm, Patient Position: Sitting, Cuff Size: Large)   Pulse 76   Temp 97 5 °F (36 4 °C) (Temporal)   Ht 6' 2" (1 88 m)   Wt (!) 154 kg (340 lb)   BMI 43 65 kg/m²     Lower Extremity Exam:    Diabetic Foot Exam    Musculoskeletal:  MMT is 4/5 to all compartments of the LE, +2/4 edema B/L, Hallux limitus is present  Equinus is present  Vascular:   R DP is +1/4, R PT is +1/4, L DP is +1/4, L PT is +1/4, CFT< 3sec to all digits  Pedal hair is Absent  regular rate and rhythm  Skin:  No open Lesions  Skin of the LE is normal texture, temperature, turgor  Interdigital maceration is not present  Nails elongated and thickened with subungual debris x10  Xerotic skin is noted of the plantar surface of the feet bilaterally  Plantar skin of the bilateral feet is erythematous, scaly, and peeling  This is consistent with tinea pedis  Neurologic:  Gross sensation is intact  Protective sensation is Diminished  Vibratory sensation is Intact  Sharp sensation is present

## 2021-06-02 DIAGNOSIS — B35.3 TINEA PEDIS OF BOTH FEET: ICD-10-CM

## 2021-06-02 RX ORDER — KETOCONAZOLE 20 MG/G
CREAM TOPICAL DAILY
Qty: 60 G | Refills: 1 | OUTPATIENT
Start: 2021-06-02

## 2021-06-02 NOTE — TELEPHONE ENCOUNTER
Patient should have one refill remaining  We will discuss further need for treatment at his next appointment

## 2021-06-05 DIAGNOSIS — E11.65 TYPE 2 DIABETES MELLITUS WITH HYPERGLYCEMIA, WITHOUT LONG-TERM CURRENT USE OF INSULIN (HCC): ICD-10-CM

## 2021-06-07 RX ORDER — BLOOD-GLUCOSE METER
EACH MISCELLANEOUS
Qty: 100 EACH | Refills: 0 | Status: SHIPPED | OUTPATIENT
Start: 2021-06-07

## 2021-06-19 DIAGNOSIS — E11.65 TYPE 2 DIABETES MELLITUS WITH HYPERGLYCEMIA, WITHOUT LONG-TERM CURRENT USE OF INSULIN (HCC): ICD-10-CM

## 2021-06-19 RX ORDER — DULAGLUTIDE 0.75 MG/.5ML
0.75 INJECTION, SOLUTION SUBCUTANEOUS WEEKLY
Qty: 2 ML | Refills: 3 | Status: SHIPPED | OUTPATIENT
Start: 2021-06-19 | End: 2021-09-17

## 2021-08-23 ENCOUNTER — OFFICE VISIT (OUTPATIENT)
Dept: MULTI SPECIALTY CLINIC | Facility: CLINIC | Age: 56
End: 2021-08-23

## 2021-08-23 VITALS — DIASTOLIC BLOOD PRESSURE: 78 MMHG | HEART RATE: 81 BPM | TEMPERATURE: 97.3 F | SYSTOLIC BLOOD PRESSURE: 129 MMHG

## 2021-08-23 DIAGNOSIS — E11.42 TYPE 2 DIABETES MELLITUS WITH DIABETIC POLYNEUROPATHY, WITHOUT LONG-TERM CURRENT USE OF INSULIN (HCC): Primary | ICD-10-CM

## 2021-08-23 DIAGNOSIS — B35.1 ONYCHOMYCOSIS: ICD-10-CM

## 2021-08-23 PROCEDURE — 11721 DEBRIDE NAIL 6 OR MORE: CPT | Performed by: PODIATRIST

## 2021-08-23 PROCEDURE — 99213 OFFICE O/P EST LOW 20 MIN: CPT | Performed by: PODIATRIST

## 2021-08-24 NOTE — PROGRESS NOTES
[address] 49 y o  male MRN: [id number]  Encounter: [id number]      Assessment/Plan        Diagnoses and all orders for this visit:    Type 2 diabetes mellitus with diabetic polyneuropathy, without long-term current use of insulin ([facility] [address] )    Onychomycosis         Plan:   Patient was seen/examined  All questions and concerns addressed   Nails x10 was sharply debrided to normal length thickness with large nail Nipper without incident   Patient states that tinea pedis is taking care of no need for more ketoconazole cream    Stressed the importance of glycemic control, shoe gear, and proper diabetic foot care   Educated patient on importance of edema control with compressive stockings   RTC in 3 month(s)    Dr Gregorio Hendricks was present during this entire procedure  History of Present Illness     HPI:  Patient states that he has had improvement of symptoms with ketoconazole cream the past few months  States that he done longer has the itchy feeling to the  Does state that since his stroke in 2017 the last of 3 strokes that he suffered from  He has had tingling in the left foot especially at nighttime  States that his where CT medication takes care of that and that he will follow up with his primary care provider to continue that medication  His only complaint or podiatry is long thick mycotic nails  He denies any history of ulceration foot, podiatric surgery or any other podiatric complaints this  Review of Systems   Constitutional: Negative  HENT: Negative  Eyes: Negative  Respiratory: Negative  Cardiovascular: Negative  Gastrointestinal: Negative  Musculoskeletal: limb swelling  Skin: onychomycosis  Neurological: Negative  Historical Information   History reviewed  No pertinent past medical history  History reviewed  No pertinent surgical history    Social History   Social History     Substance and Sexual Activity   Alcohol Use Yes    Comment: occ Social History     Substance and Sexual Activity   Drug Use Never     Social History     Tobacco Use   Smoking Status Never Smoker   Smokeless Tobacco Never Used   Tobacco Comment    cigar     Family History: History reviewed  No pertinent family history  Meds/Allergies   (Not in a hospital admission)    No Known Allergies    Objective     Current Vitals:   Blood Pressure: 129/78 (08/23/21 1439)  Pulse: 81 (08/23/21 1439)  Temperature: (!) 97 3 °F (36 3 °C) (08/23/21 1439)  Temp Source: Temporal (08/23/21 1439)        /78 (BP Location: Right arm, Patient Position: Sitting, Cuff Size: Large)   Pulse 81   Temp (!) 97 3 °F (36 3 °C) (Temporal)       Lower Extremity Exam:    Vascular: Right foot DP/PT +1                   Left foot DP/PT +1                   There is +1 lower extremity edema bilateral     Musculoskeletal: There is -4/5 strength throughout the left lower extremity  With 4/5 strength to the right lower extremity  limited ankle range of motion with well maintained subtalar range of motion  There is no tenderness over the foot and ankle  There is not foot deformities    Neurological: Sensation to 5 07 Thayne-Valeria nylon filament: negative bilaterally      Vibratory sense to distal Foot  negative bilaterally      Sharp/Dull sense is negative bilaterally      Dermatology: Skin Condition:  dryness and scaling     There is not evidence of macerated tissue between toe spaces  Nail Exam: onychomycosis of the toenails       Open ulcerations: No     Calluses: No

## 2021-10-13 ENCOUNTER — OFFICE VISIT (OUTPATIENT)
Dept: URGENT CARE | Age: 56
End: 2021-10-13
Payer: COMMERCIAL

## 2021-10-13 VITALS
SYSTOLIC BLOOD PRESSURE: 155 MMHG | RESPIRATION RATE: 16 BRPM | TEMPERATURE: 97.7 F | DIASTOLIC BLOOD PRESSURE: 92 MMHG | HEART RATE: 96 BPM | OXYGEN SATURATION: 100 %

## 2021-10-13 DIAGNOSIS — K08.89 PAIN, DENTAL: Primary | ICD-10-CM

## 2021-10-13 PROCEDURE — 99213 OFFICE O/P EST LOW 20 MIN: CPT | Performed by: FAMILY MEDICINE

## 2021-10-13 PROCEDURE — G0463 HOSPITAL OUTPT CLINIC VISIT: HCPCS | Performed by: FAMILY MEDICINE

## 2021-10-13 RX ORDER — SITAGLIPTIN 100 MG/1
TABLET, FILM COATED ORAL
COMMUNITY
Start: 2021-09-26

## 2021-10-13 RX ORDER — GLIPIZIDE 10 MG/1
TABLET, FILM COATED, EXTENDED RELEASE ORAL
COMMUNITY
Start: 2021-10-07

## 2021-10-13 RX ORDER — SPIRONOLACTONE AND HYDROCHLOROTHIAZIDE 25; 25 MG/1; MG/1
TABLET ORAL
COMMUNITY
Start: 2021-09-23

## 2021-10-13 RX ORDER — DOXYCYCLINE 100 MG/1
100 TABLET ORAL 2 TIMES DAILY
Qty: 20 TABLET | Refills: 0 | Status: SHIPPED | OUTPATIENT
Start: 2021-10-13 | End: 2021-10-23

## 2021-10-13 RX ORDER — PREGABALIN 75 MG/1
CAPSULE ORAL
COMMUNITY
Start: 2021-10-10

## 2021-11-29 ENCOUNTER — TELEPHONE (OUTPATIENT)
Dept: INTERNAL MEDICINE CLINIC | Facility: CLINIC | Age: 56
End: 2021-11-29

## 2023-06-05 ENCOUNTER — HOSPITAL ENCOUNTER (INPATIENT)
Facility: HOSPITAL | Age: 58
LOS: 5 days | Discharge: HOME | DRG: 417 | End: 2023-06-10
Attending: EMERGENCY MEDICINE | Admitting: STUDENT IN AN ORGANIZED HEALTH CARE EDUCATION/TRAINING PROGRAM
Payer: COMMERCIAL

## 2023-06-05 ENCOUNTER — APPOINTMENT (INPATIENT)
Dept: RADIOLOGY | Facility: HOSPITAL | Age: 58
DRG: 417 | End: 2023-06-05
Payer: COMMERCIAL

## 2023-06-05 DIAGNOSIS — R10.9 ABDOMINAL PAIN: Primary | ICD-10-CM

## 2023-06-05 DIAGNOSIS — K85.90 ACUTE PANCREATITIS: ICD-10-CM

## 2023-06-05 DIAGNOSIS — R11.2 NAUSEA AND VOMITING: ICD-10-CM

## 2023-06-05 DIAGNOSIS — K81.0 ACUTE CHOLECYSTITIS: ICD-10-CM

## 2023-06-05 PROBLEM — R10.13 EPIGASTRIC PAIN: Status: ACTIVE | Noted: 2023-06-05

## 2023-06-05 PROBLEM — I10 PRIMARY HYPERTENSION: Status: ACTIVE | Noted: 2023-06-05

## 2023-06-05 LAB
ALBUMIN SERPL BCP-MCNC: 3.3 G/DL (ref 3.5–5)
ALP SERPL-CCNC: 69 U/L (ref 46–116)
ALT SERPL W P-5'-P-CCNC: 27 U/L (ref 12–78)
AMYLASE SERPL-CCNC: 120 IU/L (ref 25–115)
ANION GAP SERPL CALCULATED.3IONS-SCNC: 2 MMOL/L (ref 4–13)
AST SERPL W P-5'-P-CCNC: 22 U/L (ref 5–45)
ATRIAL RATE: 80 BPM
BASOPHILS # BLD AUTO: 0.03 THOUSANDS/ÂΜL (ref 0–0.1)
BASOPHILS NFR BLD AUTO: 0 % (ref 0–1)
BILIRUB SERPL-MCNC: 0.34 MG/DL (ref 0.2–1)
BUN SERPL-MCNC: 12 MG/DL (ref 5–25)
CALCIUM ALBUM COR SERPL-MCNC: 9.5 MG/DL (ref 8.3–10.1)
CALCIUM SERPL-MCNC: 8.9 MG/DL (ref 8.3–10.1)
CHLORIDE SERPL-SCNC: 107 MMOL/L (ref 96–108)
CHOLEST SERPL-MCNC: 139 MG/DL
CO2 SERPL-SCNC: 24 MMOL/L (ref 21–32)
CREAT SERPL-MCNC: 1.04 MG/DL (ref 0.6–1.3)
EOSINOPHIL # BLD AUTO: 0.01 THOUSAND/ÂΜL (ref 0–0.61)
EOSINOPHIL NFR BLD AUTO: 0 % (ref 0–6)
ERYTHROCYTE [DISTWIDTH] IN BLOOD BY AUTOMATED COUNT: 12.9 % (ref 11.6–15.1)
GFR SERPL CREATININE-BSD FRML MDRD: 79 ML/MIN/1.73SQ M
GLUCOSE SERPL-MCNC: 191 MG/DL (ref 65–140)
GLUCOSE SERPL-MCNC: 191 MG/DL (ref 65–140)
GLUCOSE SERPL-MCNC: 217 MG/DL (ref 65–140)
HCT VFR BLD AUTO: 43 % (ref 36.5–49.3)
HDLC SERPL-MCNC: 38 MG/DL
HGB BLD-MCNC: 14.3 G/DL (ref 12–17)
IMM GRANULOCYTES # BLD AUTO: 0.04 THOUSAND/UL (ref 0–0.2)
IMM GRANULOCYTES NFR BLD AUTO: 0 % (ref 0–2)
LDLC SERPL CALC-MCNC: 90 MG/DL (ref 0–100)
LIPASE SERPL-CCNC: 450 U/L (ref 73–393)
LYMPHOCYTES # BLD AUTO: 1.26 THOUSANDS/ÂΜL (ref 0.6–4.47)
LYMPHOCYTES NFR BLD AUTO: 12 % (ref 14–44)
MCH RBC QN AUTO: 29 PG (ref 26.8–34.3)
MCHC RBC AUTO-ENTMCNC: 33.3 G/DL (ref 31.4–37.4)
MCV RBC AUTO: 87 FL (ref 82–98)
MONOCYTES # BLD AUTO: 0.36 THOUSAND/ÂΜL (ref 0.17–1.22)
MONOCYTES NFR BLD AUTO: 4 % (ref 4–12)
NEUTROPHILS # BLD AUTO: 8.64 THOUSANDS/ÂΜL (ref 1.85–7.62)
NEUTS SEG NFR BLD AUTO: 84 % (ref 43–75)
NRBC BLD AUTO-RTO: 0 /100 WBCS
P AXIS: 68 DEGREES
PLATELET # BLD AUTO: 206 THOUSANDS/UL (ref 149–390)
PMV BLD AUTO: 11 FL (ref 8.9–12.7)
POTASSIUM SERPL-SCNC: 4.3 MMOL/L (ref 3.5–5.3)
PR INTERVAL: 184 MS
PROT SERPL-MCNC: 8.3 G/DL (ref 6.4–8.4)
QRS AXIS: 68 DEGREES
QRSD INTERVAL: 98 MS
QT INTERVAL: 402 MS
QTC INTERVAL: 463 MS
RBC # BLD AUTO: 4.93 MILLION/UL (ref 3.88–5.62)
SODIUM SERPL-SCNC: 133 MMOL/L (ref 135–147)
T WAVE AXIS: 83 DEGREES
TRIGL SERPL-MCNC: 56 MG/DL
VENTRICULAR RATE: 80 BPM
WBC # BLD AUTO: 10.34 THOUSAND/UL (ref 4.31–10.16)

## 2023-06-05 PROCEDURE — 99285 EMERGENCY DEPT VISIT HI MDM: CPT | Performed by: EMERGENCY MEDICINE

## 2023-06-05 PROCEDURE — 80061 LIPID PANEL: CPT

## 2023-06-05 PROCEDURE — 94760 N-INVAS EAR/PLS OXIMETRY 1: CPT

## 2023-06-05 PROCEDURE — 96365 THER/PROPH/DIAG IV INF INIT: CPT

## 2023-06-05 PROCEDURE — 82948 REAGENT STRIP/BLOOD GLUCOSE: CPT

## 2023-06-05 PROCEDURE — 83690 ASSAY OF LIPASE: CPT

## 2023-06-05 PROCEDURE — 99285 EMERGENCY DEPT VISIT HI MDM: CPT

## 2023-06-05 PROCEDURE — 93010 ELECTROCARDIOGRAM REPORT: CPT | Performed by: INTERNAL MEDICINE

## 2023-06-05 PROCEDURE — 85025 COMPLETE CBC W/AUTO DIFF WBC: CPT

## 2023-06-05 PROCEDURE — 94660 CPAP INITIATION&MGMT: CPT

## 2023-06-05 PROCEDURE — 74177 CT ABD & PELVIS W/CONTRAST: CPT

## 2023-06-05 PROCEDURE — 36415 COLL VENOUS BLD VENIPUNCTURE: CPT

## 2023-06-05 PROCEDURE — 96375 TX/PRO/DX INJ NEW DRUG ADDON: CPT

## 2023-06-05 PROCEDURE — 80053 COMPREHEN METABOLIC PANEL: CPT

## 2023-06-05 PROCEDURE — 96366 THER/PROPH/DIAG IV INF ADDON: CPT

## 2023-06-05 PROCEDURE — 82150 ASSAY OF AMYLASE: CPT

## 2023-06-05 PROCEDURE — G1004 CDSM NDSC: HCPCS

## 2023-06-05 PROCEDURE — 99223 1ST HOSP IP/OBS HIGH 75: CPT | Performed by: STUDENT IN AN ORGANIZED HEALTH CARE EDUCATION/TRAINING PROGRAM

## 2023-06-05 PROCEDURE — 93005 ELECTROCARDIOGRAM TRACING: CPT

## 2023-06-05 RX ORDER — AMMONIUM LACTATE 12 G/100G
CREAM TOPICAL DAILY
Status: DISCONTINUED | OUTPATIENT
Start: 2023-06-05 | End: 2023-06-10 | Stop reason: HOSPADM

## 2023-06-05 RX ORDER — SODIUM CHLORIDE, SODIUM GLUCONATE, SODIUM ACETATE, POTASSIUM CHLORIDE, MAGNESIUM CHLORIDE, SODIUM PHOSPHATE, DIBASIC, AND POTASSIUM PHOSPHATE .53; .5; .37; .037; .03; .012; .00082 G/100ML; G/100ML; G/100ML; G/100ML; G/100ML; G/100ML; G/100ML
500 INJECTION, SOLUTION INTRAVENOUS ONCE
Status: COMPLETED | OUTPATIENT
Start: 2023-06-05 | End: 2023-06-05

## 2023-06-05 RX ORDER — CLOPIDOGREL BISULFATE 75 MG/1
75 TABLET ORAL DAILY
Status: DISCONTINUED | OUTPATIENT
Start: 2023-06-05 | End: 2023-06-10 | Stop reason: HOSPADM

## 2023-06-05 RX ORDER — ONDANSETRON 2 MG/ML
4 INJECTION INTRAMUSCULAR; INTRAVENOUS EVERY 6 HOURS PRN
Status: DISCONTINUED | OUTPATIENT
Start: 2023-06-05 | End: 2023-06-10 | Stop reason: HOSPADM

## 2023-06-05 RX ORDER — INSULIN LISPRO 100 [IU]/ML
2-12 INJECTION, SOLUTION INTRAVENOUS; SUBCUTANEOUS EVERY 6 HOURS SCHEDULED
Status: DISCONTINUED | OUTPATIENT
Start: 2023-06-05 | End: 2023-06-08

## 2023-06-05 RX ORDER — SPIRONOLACTONE AND HYDROCHLOROTHIAZIDE 25; 25 MG/1; MG/1
1 TABLET ORAL DAILY
Status: DISCONTINUED | OUTPATIENT
Start: 2023-06-05 | End: 2023-06-05

## 2023-06-05 RX ORDER — METOPROLOL TARTRATE 5 MG/5ML
2.5 INJECTION INTRAVENOUS EVERY 6 HOURS PRN
Status: DISCONTINUED | OUTPATIENT
Start: 2023-06-05 | End: 2023-06-10 | Stop reason: HOSPADM

## 2023-06-05 RX ORDER — LISINOPRIL 20 MG/1
20 TABLET ORAL DAILY
Status: DISCONTINUED | OUTPATIENT
Start: 2023-06-05 | End: 2023-06-10 | Stop reason: HOSPADM

## 2023-06-05 RX ORDER — SODIUM CHLORIDE, SODIUM GLUCONATE, SODIUM ACETATE, POTASSIUM CHLORIDE, MAGNESIUM CHLORIDE, SODIUM PHOSPHATE, DIBASIC, AND POTASSIUM PHOSPHATE .53; .5; .37; .037; .03; .012; .00082 G/100ML; G/100ML; G/100ML; G/100ML; G/100ML; G/100ML; G/100ML
100 INJECTION, SOLUTION INTRAVENOUS CONTINUOUS
Status: DISPENSED | OUTPATIENT
Start: 2023-06-05 | End: 2023-06-09

## 2023-06-05 RX ORDER — HYDROCHLOROTHIAZIDE 25 MG/1
25 TABLET ORAL DAILY
Status: DISCONTINUED | OUTPATIENT
Start: 2023-06-05 | End: 2023-06-10 | Stop reason: HOSPADM

## 2023-06-05 RX ORDER — SPIRONOLACTONE 25 MG/1
25 TABLET ORAL DAILY
Status: DISCONTINUED | OUTPATIENT
Start: 2023-06-05 | End: 2023-06-10 | Stop reason: HOSPADM

## 2023-06-05 RX ORDER — ACETAMINOPHEN 325 MG/1
650 TABLET ORAL EVERY 6 HOURS PRN
Status: DISCONTINUED | OUTPATIENT
Start: 2023-06-05 | End: 2023-06-06

## 2023-06-05 RX ORDER — ATORVASTATIN CALCIUM 40 MG/1
40 TABLET, FILM COATED ORAL DAILY
Status: DISCONTINUED | OUTPATIENT
Start: 2023-06-05 | End: 2023-06-10 | Stop reason: HOSPADM

## 2023-06-05 RX ORDER — MAGNESIUM HYDROXIDE/ALUMINUM HYDROXICE/SIMETHICONE 120; 1200; 1200 MG/30ML; MG/30ML; MG/30ML
30 SUSPENSION ORAL EVERY 6 HOURS PRN
Status: DISCONTINUED | OUTPATIENT
Start: 2023-06-05 | End: 2023-06-10 | Stop reason: HOSPADM

## 2023-06-05 RX ORDER — DOCUSATE SODIUM 100 MG/1
100 CAPSULE, LIQUID FILLED ORAL 2 TIMES DAILY
Status: DISCONTINUED | OUTPATIENT
Start: 2023-06-05 | End: 2023-06-10 | Stop reason: HOSPADM

## 2023-06-05 RX ORDER — ONDANSETRON 2 MG/ML
4 INJECTION INTRAMUSCULAR; INTRAVENOUS ONCE
Status: COMPLETED | OUTPATIENT
Start: 2023-06-05 | End: 2023-06-05

## 2023-06-05 RX ORDER — HYDROMORPHONE HCL/PF 1 MG/ML
0.5 SYRINGE (ML) INJECTION EVERY 4 HOURS PRN
Status: DISCONTINUED | OUTPATIENT
Start: 2023-06-05 | End: 2023-06-10 | Stop reason: HOSPADM

## 2023-06-05 RX ORDER — HYDROMORPHONE HCL/PF 1 MG/ML
0.5 SYRINGE (ML) INJECTION ONCE
Status: COMPLETED | OUTPATIENT
Start: 2023-06-05 | End: 2023-06-05

## 2023-06-05 RX ORDER — ENOXAPARIN SODIUM 100 MG/ML
40 INJECTION SUBCUTANEOUS DAILY
Status: DISCONTINUED | OUTPATIENT
Start: 2023-06-05 | End: 2023-06-10 | Stop reason: HOSPADM

## 2023-06-05 RX ORDER — OXYCODONE HYDROCHLORIDE 5 MG/1
5 TABLET ORAL EVERY 4 HOURS PRN
Status: DISCONTINUED | OUTPATIENT
Start: 2023-06-05 | End: 2023-06-10 | Stop reason: HOSPADM

## 2023-06-05 RX ORDER — OXYCODONE HYDROCHLORIDE 10 MG/1
10 TABLET ORAL EVERY 4 HOURS PRN
Status: DISCONTINUED | OUTPATIENT
Start: 2023-06-05 | End: 2023-06-10 | Stop reason: HOSPADM

## 2023-06-05 RX ORDER — SENNOSIDES 8.6 MG
1 TABLET ORAL DAILY
Status: DISCONTINUED | OUTPATIENT
Start: 2023-06-05 | End: 2023-06-10 | Stop reason: HOSPADM

## 2023-06-05 RX ORDER — INSULIN LISPRO 100 [IU]/ML
1-6 INJECTION, SOLUTION INTRAVENOUS; SUBCUTANEOUS
Status: DISCONTINUED | OUTPATIENT
Start: 2023-06-06 | End: 2023-06-08

## 2023-06-05 RX ORDER — AMLODIPINE BESYLATE 5 MG/1
5 TABLET ORAL DAILY
Status: DISCONTINUED | OUTPATIENT
Start: 2023-06-05 | End: 2023-06-10 | Stop reason: HOSPADM

## 2023-06-05 RX ORDER — PREGABALIN 75 MG/1
75 CAPSULE ORAL 2 TIMES DAILY
Status: DISCONTINUED | OUTPATIENT
Start: 2023-06-05 | End: 2023-06-10 | Stop reason: HOSPADM

## 2023-06-05 RX ADMIN — PREGABALIN 75 MG: 75 CAPSULE ORAL at 22:12

## 2023-06-05 RX ADMIN — HYDROMORPHONE HYDROCHLORIDE 0.5 MG: 1 INJECTION, SOLUTION INTRAMUSCULAR; INTRAVENOUS; SUBCUTANEOUS at 08:36

## 2023-06-05 RX ADMIN — HYDROMORPHONE HYDROCHLORIDE 0.5 MG: 1 INJECTION, SOLUTION INTRAMUSCULAR; INTRAVENOUS; SUBCUTANEOUS at 21:04

## 2023-06-05 RX ADMIN — OXYCODONE HYDROCHLORIDE 10 MG: 10 TABLET ORAL at 22:12

## 2023-06-05 RX ADMIN — METOPROLOL TARTRATE 2.5 MG: 1 INJECTION, SOLUTION INTRAVENOUS at 14:44

## 2023-06-05 RX ADMIN — SODIUM CHLORIDE, SODIUM GLUCONATE, SODIUM ACETATE, POTASSIUM CHLORIDE, MAGNESIUM CHLORIDE, SODIUM PHOSPHATE, DIBASIC, AND POTASSIUM PHOSPHATE 500 ML: .53; .5; .37; .037; .03; .012; .00082 INJECTION, SOLUTION INTRAVENOUS at 09:32

## 2023-06-05 RX ADMIN — AMLODIPINE BESYLATE 5 MG: 5 TABLET ORAL at 13:36

## 2023-06-05 RX ADMIN — DOCUSATE SODIUM 100 MG: 100 CAPSULE, LIQUID FILLED ORAL at 13:37

## 2023-06-05 RX ADMIN — ATORVASTATIN CALCIUM 40 MG: 40 TABLET, FILM COATED ORAL at 13:37

## 2023-06-05 RX ADMIN — ALUMINUM HYDROXIDE, MAGNESIUM HYDROXIDE, AND SIMETHICONE 30 ML: 200; 200; 20 SUSPENSION ORAL at 11:15

## 2023-06-05 RX ADMIN — HYDROCHLOROTHIAZIDE 25 MG: 25 TABLET ORAL at 13:31

## 2023-06-05 RX ADMIN — OXYCODONE HYDROCHLORIDE 10 MG: 10 TABLET ORAL at 13:32

## 2023-06-05 RX ADMIN — ACETAMINOPHEN 650 MG: 325 TABLET ORAL at 13:32

## 2023-06-05 RX ADMIN — SODIUM CHLORIDE, SODIUM GLUCONATE, SODIUM ACETATE, POTASSIUM CHLORIDE, MAGNESIUM CHLORIDE, SODIUM PHOSPHATE, DIBASIC, AND POTASSIUM PHOSPHATE 500 ML: .53; .5; .37; .037; .03; .012; .00082 INJECTION, SOLUTION INTRAVENOUS at 08:37

## 2023-06-05 RX ADMIN — ONDANSETRON 4 MG: 2 INJECTION INTRAMUSCULAR; INTRAVENOUS at 14:49

## 2023-06-05 RX ADMIN — LISINOPRIL 20 MG: 20 TABLET ORAL at 13:37

## 2023-06-05 RX ADMIN — HYDROMORPHONE HYDROCHLORIDE 0.5 MG: 1 INJECTION, SOLUTION INTRAMUSCULAR; INTRAVENOUS; SUBCUTANEOUS at 14:49

## 2023-06-05 RX ADMIN — INSULIN LISPRO 2 UNITS: 100 INJECTION, SOLUTION INTRAVENOUS; SUBCUTANEOUS at 17:52

## 2023-06-05 RX ADMIN — OXYCODONE HYDROCHLORIDE 10 MG: 10 TABLET ORAL at 17:52

## 2023-06-05 RX ADMIN — CLOPIDOGREL BISULFATE 75 MG: 75 TABLET ORAL at 13:37

## 2023-06-05 RX ADMIN — SODIUM CHLORIDE, SODIUM GLUCONATE, SODIUM ACETATE, POTASSIUM CHLORIDE, MAGNESIUM CHLORIDE, SODIUM PHOSPHATE, DIBASIC, AND POTASSIUM PHOSPHATE 100 ML/HR: .53; .5; .37; .037; .03; .012; .00082 INJECTION, SOLUTION INTRAVENOUS at 13:39

## 2023-06-05 RX ADMIN — SPIRONOLACTONE 25 MG: 25 TABLET ORAL at 13:37

## 2023-06-05 RX ADMIN — IOHEXOL 100 ML: 350 INJECTION, SOLUTION INTRAVENOUS at 19:33

## 2023-06-05 RX ADMIN — SENNOSIDES 8.6 MG: 8.6 TABLET, FILM COATED ORAL at 13:37

## 2023-06-05 RX ADMIN — PREGABALIN 75 MG: 75 CAPSULE ORAL at 13:37

## 2023-06-05 RX ADMIN — ONDANSETRON 4 MG: 2 INJECTION INTRAMUSCULAR; INTRAVENOUS at 08:36

## 2023-06-05 RX ADMIN — ASPIRIN 81 MG: 81 TABLET, COATED ORAL at 13:37

## 2023-06-05 RX ADMIN — ENOXAPARIN SODIUM 40 MG: 40 INJECTION SUBCUTANEOUS at 13:31

## 2023-06-05 NOTE — H&P
[facility]  H&P  Name: Chase Lubin 66 y o  male I MRN: [id number]  Unit/Bed#: [facility] 341-73 I Date of Admission: 6/5/2023   Date of Service: 6/5/2023  Hospital Day: 0      Assessment/Plan   * Epigastric pain  Assessment & Plan  · Patient presented with epigastric pain, radiating to back and lower abdomen associated with vomiting  Suspected pancreatitis however he does not meet 2 out of 3 criteria for pancreatitis, pending CT imaging  · Lipase and amylase slightly elevated above normal range  · Will order CT abdomen pelvis as he reports feeling bloated and is not able to pass gas, to rule out obstruction versus pancreatitis  · Will obtain lipid panel to rule out triglyceride induced pancreatitis  · Keep n p o  for now  · Continue IV fluids  · Conservative management with Zofran, Mylanta and pain control  Primary hypertension  Assessment & Plan  · Patient is on Aldactazide 25/25 and Lotrel  5-20 at home  · Resume same  · On presentation have been noted to have significantly elevated blood pressure likely due to pain  · We will order Lopressor as needed for systolic blood pressure above 180  DM (diabetes mellitus), type 2 [facility])  Assessment & Plan  Lab Results   Component Value Date    HGBA1C 7 0 (H) 04/06/2023       Recent Labs     06/05/23  0840 06/05/23  1551   POCGLU 217* 191*       Blood Sugar Average: Last 72 hrs:  (P) 204     · Patient is on metformin, glipizide and Januvia at home  · Will start on sliding scale insulin while inpatient  Can also start on Lantus once diet resumed  · Continue hypoglycemia protocol  · Continue Lyrica at home dosage  PAD (peripheral artery disease) ([facility] [address] )  Assessment & Plan  · Patient is on aspirin, Plavix and statin at home that he has stopped taking as he ran out of prescription    · Continue aspirin, Plavix and statin while in hospital     CVA (cerebral vascular accident) [facility])  Assessment & Plan  History noted, patient has stopped taking aspirin and statin at home  Resumed while in hospital, encouraged on compliance  Hyperlipidemia  Assessment & Plan  · Patient is on atorvastatin at home that he has stopped taking, will resume while inpatient  · He was encouraged on being compliant with his medications  Morbid obesity with BMI of 40 0-44 9, adult ([facility] [address] )  Assessment & Plan  · Patient's BMI 40  87  · Encouraged on lifestyle modification including diet changes and regular exercise, per patient he tries to exercise every day but have been noncompliant with diet changes  Obstructive sleep apnea syndrome  Assessment & Plan  CPAP at nighttime  VTE Pharmacologic Prophylaxis: VTE Score: 3 Moderate Risk (Score 3-4) - Pharmacological DVT Prophylaxis Ordered: enoxaparin (Lovenox)  Code Status: Level 1 - Full Code per patient  Discussion with family: Patient declined call to   As his son just left ED  Anticipated Length of Stay: Patient will be admitted on an inpatient basis with an anticipated length of stay of greater than 2 midnights secondary to Ongoing nausea vomiting, poor oral intake needing IV fluids, rule out pancreatitis  Total Time Spent on Date of Encounter in care of patient: 75 minutes This time was spent on one or more of the following: performing physical exam; counseling and coordination of care; obtaining or reviewing history; documenting in the medical record; reviewing/ordering tests, medications or procedures; communicating with other healthcare professionals and discussing with patient's family/caregivers  Chief Complaint: Abdominal pain    History of Present Illness:  Carolyn Eldridge is a 66 y o  male with a PMH of hyperlipidemia, peripheral vascular disease, CVA, diabetes and sleep apnea who presents with abdominal pain that started last night    According to patient he was in his usual state of health until last night when he started epigastric pain that gradually worsened over time, radiating to back and lower abdomen, associated with nausea and vomiting  Patient reports 4 episodes of nonbilious vomiting at home, is unable to keep anything p o  hence visited ED for further evaluation  His last alcohol intake was yesterday, reports drank only 1 shot  He smokes cigar almost every day  Patient has been noncompliant with Lipitor, aspirin and Plavix  He denies similar episodes in the past   Denies anyone in family having similar complaints  Has not dine out recently and denies eating anything unusual      On presentation to ED his blood pressure was noted to be 206/125 saturating well on room air, no signs of tachycardia or tachypnea  Labs remarkable for mild leukocytosis with WBC count of 10 34, lipase and amylase slightly elevated  Review of Systems:  Review of Systems   Constitutional: Positive for appetite change and fatigue  Gastrointestinal: Positive for abdominal pain, constipation, nausea and vomiting  Musculoskeletal: Positive for back pain  All other systems reviewed and are negative  Past Medical and Surgical History:   Past Medical History:   Diagnosis Date   • [facility]    • Hypertension    • Stroke [facility])        History reviewed  No pertinent surgical history  Meds/Allergies:  Prior to Admission medications    Medication Sig Start Date End Date Taking?  Authorizing Provider   amLODIPine-benazepril (LOTREL 5-20) 5-20 MG per capsule TAKE 1 CAPSULE BY ORAL ROUTE ONCE DAILY 12/23/19  Yes Historical Provider, MD   ammonium lactate (LAC-HYDRIN) 12 % cream APPLY TOPICALLY 2 (TWO) TIMES A DAY AS NEEDED FOR DRY SKIN 2/15/20   Johana Taylor DPM   ASPIRIN 81 81 MG EC tablet TAKE 1 TABLET (81 MG) BY ORAL ROUTE ONCE DAILY 12/23/19   Historical Provider, MD   atorvastatin (LIPITOR) 40 mg tablet Take 40 mg by mouth daily 12/23/19   Historical Provider, MD   Blood Glucose Monitoring Suppl (ONE TOUCH ULTRA 2) w/Device KIT TEST BLOOD SUGAR LEVELS ONE TIME A DAY 12/30/19   Historical Provider, MD   clopidogrel (PLAVIX) 75 mg tablet Take 75 mg by mouth    Historical Provider, MD   Easy Comfort Pen Needles 31G X 6 MM MISC USE WITH INSULIN PEN 6/7/21   Paulette Lam PA-C   glipiZIDE (GLUCOTROL XL) 10 mg 24 hr tablet TAKE 1 TABLET(S) EVERY DAY BY ORAL ROUTE WITH MEALS FOR 90 DAYS  10/7/21   Historical Provider, MD   glipiZIDE (GLUCOTROL) 10 mg tablet Take 10 mg by mouth    Historical Provider, MD   Januvia 100 MG tablet TAKE 1 TABLET(S) EVERY DAY BY ORAL ROUTE WITH MEALS  9/26/21   Historical Provider, MD   ketoconazole (NIZORAL) 2 % cream Apply topically daily 5/10/21   Byron Yun DPM   metFORMIN (GLUCOPHAGE-XR) 500 mg 24 hr tablet TAKE 1 TABLET (500 MG TOTAL) BY MOUTH DAILY WITH BREAKFAST 3/19/21   Arnold Marcum MD   polyethylene glycol (GLYCOLAX) powder At 5pm take 5mgx2 dulcolax  At 6pm 32oz miralax in 64oz gatorade  Drink 8oz glass every 5 mins until 32oz finished  Drink remaining as rec  Patient not taking: Reported on 5/10/2021 2/18/20   Urban Bailey MD   pregabalin (LYRICA) 75 mg capsule TAKE 1 CAPSULE 2 TIMES PER DAY 10/10/21   Historical Provider, MD   spironolactone-hydrochlorothiazide (ALDACTAZIDE) 25-25 mg per tablet TAKE 1 TABLET(S) EVERY DAY BY ORAL ROUTE IN THE MORNING   9/23/21   Historical Provider, MD   Trulicity 7 56 PAREKH/7 4YD SOPN INJECT 0 5 ML (0 75 MG TOTAL) UNDER THE SKIN ONCE A WEEK 9/17/21   Arnold Marcum MD     I have reviewed home medications with patient personally      Allergies: No Known Allergies    Social History:  Marital Status: Single   Occupation: employed  Patient Pre-hospital Living Situation: Home  Patient Pre-hospital Level of Mobility: walks  Patient Pre-hospital Diet Restrictions: no  Substance Use History:   Social History     Substance and Sexual Activity   Alcohol Use Yes    Comment: occ     Social History     Tobacco Use   Smoking Status Some Days   • Types: Cigars   Smokeless Tobacco Never "  Tobacco Comments    cigar     Social History     Substance and Sexual Activity   Drug Use Never       Family History:  History reviewed  No pertinent family history  Physical Exam:     Vitals:   Blood Pressure: (!) 188/102 (06/05/23 1432)  Pulse: 94 (06/05/23 1423)  Temperature: 97 8 °F (36 6 °C) (06/05/23 1423)  Temp Source: Tympanic (06/05/23 0841)  Respirations: (!) 24 (06/05/23 1423)  Height: 6' 2\" (188 cm) (06/05/23 1416)  Weight - Scale: (!) 144 kg (318 lb 5 5 oz) (06/05/23 1416)  SpO2: 97 % (06/05/23 1423)    Physical Exam  Constitutional:       Appearance: He is obese  HENT:      Head: Normocephalic and atraumatic  Right Ear: Tympanic membrane normal       Mouth/Throat:      Mouth: Mucous membranes are moist       Pharynx: Oropharynx is clear  Eyes:      Conjunctiva/sclera: Conjunctivae normal       Pupils: Pupils are equal, round, and reactive to light  Cardiovascular:      Rate and Rhythm: Normal rate and regular rhythm  Pulses: Normal pulses  Heart sounds: Normal heart sounds  Pulmonary:      Effort: Pulmonary effort is normal       Breath sounds: Normal breath sounds  Abdominal:      General: Abdomen is flat  There is distension  Tenderness: There is abdominal tenderness  Comments: Tenderness elicited on palpation of epigastrium and left lower quadrant  Musculoskeletal:         General: Normal range of motion  Skin:     General: Skin is warm and dry  Neurological:      General: No focal deficit present  Mental Status: He is alert and oriented to person, place, and time     Psychiatric:         Mood and Affect: Mood normal          Behavior: Behavior normal           Additional Data:     Lab Results:  Results from last 7 days   Lab Units 06/05/23  0838   EOS PCT % 0   HEMATOCRIT % 43 0   HEMOGLOBIN g/dL 14 3   LYMPHS PCT % 12*   MONOS PCT % 4   NEUTROS PCT % 84*   PLATELETS Thousands/uL 206   WBC Thousand/uL 10 34*     Results from last 7 days   Lab Units " 06/05/23  0838   ANION GAP mmol/L 2*   ALBUMIN g/dL 3 3*   ALK PHOS U/L 69   ALT U/L 27   AST U/L 22   BUN mg/dL 12   CALCIUM mg/dL 8 9   CHLORIDE mmol/L 107   CO2 mmol/L 24   CREATININE mg/dL 1 04   GLUCOSE RANDOM mg/dL 191*   POTASSIUM mmol/L 4 3   SODIUM mmol/L 133*   TOTAL BILIRUBIN mg/dL 0 34         Results from last 7 days   Lab Units 06/05/23  1551 06/05/23  0840   POC GLUCOSE mg/dl 191* 217*               Lines/Drains:  Invasive Devices     Peripheral Intravenous Line  Duration           Peripheral IV 06/05/23 Distal;Left;Upper;Ventral (anterior) Arm <1 day                    Imaging: No pertinent imaging reviewed  CT abdomen pelvis w contrast    (Results Pending)       EKG and Other Studies Reviewed on Admission:   · EKG: No EKG obtained  ** Please Note: This note has been constructed using a voice recognition system   **

## 2023-06-05 NOTE — ASSESSMENT & PLAN NOTE
History noted, patient has stopped taking aspirin and statin at home  Resumed while in hospital, encouraged on compliance

## 2023-06-05 NOTE — ASSESSMENT & PLAN NOTE
· Patient is on aspirin, Plavix and statin at home that he has stopped taking as he ran out of prescription    · Continue aspirin, Plavix and statin while in hospital

## 2023-06-05 NOTE — ED PROCEDURE NOTE
Procedure  POC Renal US    Date/Time: 6/5/2023 9:30 AM    Performed by: Garrick Canales MD  Authorized by:  Garrick Canales MD    Patient location:  ED  Performed by:  Resident  Other Assisting Provider: Yes (comment) (Dr Gomez Day)    Procedure details:     Exam Type:  Educational    Indications: abdominal pain      Assessment for:  Suspected hydronephrosis    Views obtained: bladder (transverse and sagittal), left kidney and right kidney      Image quality: limited diagnostic      Image availability:  Images available in PACS, still images obtained and video obtained  Findings:     LEFT kidney findings: unremarkable      LEFT hydronephrosis: none      RIGHT kidney findings: unremarkable      RIGHT hydronephrosis: none      Bladder:  Visualized    Bladder findings: distended bladder    Interpretation:     Renal ultrasound impressions: normal exam                       Garrick Canales MD  06/05/23 5995

## 2023-06-05 NOTE — ED ATTENDING ATTESTATION
6/5/2023  I, Corinne Gander, DO, saw and evaluated the patient  I have discussed the patient with the resident/non-physician practitioner and agree with the resident's/non-physician practitioner's findings, Plan of Care, and MDM as documented in the resident's/non-physician practitioner's note, except where noted  All available labs and Radiology studies were reviewed  I was present for key portions of any procedure(s) performed by the resident/non-physician practitioner and I was immediately available to provide assistance  At this point I agree with the current assessment done in the Emergency Department  I have conducted an independent evaluation of this patient a history and physical is as follows:      Patient is a 72-year-old male with a history of hyperlipidemia, peripheral arterial disease, diabetes, accompanied by his son  The patient awoke at some point in the middle the night, does not know exactly when, with some mild epigastric pain and several episodes of nonbloody, nonbilious nonbloody emesis, no diarrhea, no constipation, no cough, no fever, no chills, no travel history, no dysuria or hematuria  No one else sick with similar symptoms  Pain is worse with moving around in bed with remaining still  No migration to the pain  No chest pain, no palpitations  Son indicates patient is acting normally, he is unaware of any sick contacts either    General:  Patient is well-appearing  Head:  Atraumatic  Eyes:  Conjunctiva pink  ENT:  Mucous membranes are moist  Neck:  Supple  Cardiac:  S1-S2, without murmurs  Lungs:  Clear to auscultation bilaterally  Abdomen: Mild right upper and right lower abdominal tenderness, no tympany, no rigidity, no guarding    Extremities:  Normal range of motion  Neurologic:  Awake, fluent speech, normal comprehension, AAOx3  Skin:  Pink warm and dry  Psychiatric:  Alert, pleasant, cooperative        ED Course  ED Course as of 06/05/23 [id number] [facility] Jun 05, 2023   9583 ECG interpreted by me, sinus rhythm, rate of 80, normal axis, no ischemic or infarctive changes, no ST segment elevation or depression, episodic bigeminy but no old available for comparison     Labs Reviewed   CBC AND DIFFERENTIAL - Abnormal       Result Value Ref Range Status    WBC 10 34 (*) 4 31 - 10 16 Thousand/uL Final    RBC 4 93  3 88 - 5 62 Million/uL Final    Hemoglobin 14 3  12 0 - 17 0 g/dL Final    Hematocrit 43 0  36 5 - 49 3 % Final    MCV 87  82 - 98 fL Final    MCH 29 0  26 8 - 34 3 pg Final    MCHC 33 3  31 4 - 37 4 g/dL Final    RDW 12 9  11 6 - 15 1 % Final    MPV 11 0  8 9 - 12 7 fL Final    Platelets 307  672 - 390 Thousands/uL Final    nRBC 0  /100 WBCs Final    Neutrophils Relative 84 (*) 43 - 75 % Final    Immat GRANS % 0  0 - 2 % Final    Lymphocytes Relative 12 (*) 14 - 44 % Final    Monocytes Relative 4  4 - 12 % Final    Eosinophils Relative 0  0 - 6 % Final    Basophils Relative 0  0 - 1 % Final    Neutrophils Absolute 8 64 (*) 1 85 - 7 62 Thousands/µL Final    Immature Grans Absolute 0 04  0 00 - 0 20 Thousand/uL Final    Lymphocytes Absolute 1 26  0 60 - 4 47 Thousands/µL Final    Monocytes Absolute 0 36  0 17 - 1 22 Thousand/µL Final    Eosinophils Absolute 0 01  0 00 - 0 61 Thousand/µL Final    Basophils Absolute 0 03  0 00 - 0 10 Thousands/µL Final   COMPREHENSIVE METABOLIC PANEL - Abnormal    Sodium 133 (*) 135 - 147 mmol/L Final    Potassium 4 3  3 5 - 5 3 mmol/L Final    Chloride 107  96 - 108 mmol/L Final    CO2 24  21 - 32 mmol/L Final    ANION GAP 2 (*) 4 - 13 mmol/L Final    BUN 12  5 - 25 mg/dL Final    Creatinine 1 04  0 60 - 1 30 mg/dL Final    Comment: Standardized to IDMS reference method    Glucose 191 (*) 65 - 140 mg/dL Final    Comment: If the patient is fasting, the ADA then defines impaired fasting glucose as > 100 mg/dL and diabetes as > or equal to 123 mg/dL    Specimen collection should occur prior to Sulfasalazine administration due to the potential for falsely depressed results  Specimen collection should occur prior to Sulfapyridine administration due to the potential for falsely elevated results  Calcium 8 9  8 3 - 10 1 mg/dL Final    Corrected Calcium 9 5  8 3 - 10 1 mg/dL Final    AST 22  5 - 45 U/L Final    Comment: Specimen collection should occur prior to Sulfasalazine administration due to the potential for falsely depressed results  ALT 27  12 - 78 U/L Final    Comment: Specimen collection should occur prior to Sulfasalazine and/or Sulfapyridine administration due to the potential for falsely depressed results  Alkaline Phosphatase 69  46 - 116 U/L Final    Total Protein 8 3  6 4 - 8 4 g/dL Final    Albumin 3 3 (*) 3 5 - 5 0 g/dL Final    Total Bilirubin 0 34  0 20 - 1 00 mg/dL Final    Comment: Use of this assay is not recommended for patients undergoing treatment with eltrombopag due to the potential for falsely elevated results      eGFR 79  ml/min/1 73sq m Final    Narrative:     [place] guidelines for Chronic Kidney Disease (CKD):   •  Stage 1 with normal or high GFR (GFR > 90 mL/min/1 73 square meters)  •  Stage 2 Mild CKD (GFR = 60-89 mL/min/1 73 square meters)  •  Stage 3A Moderate CKD (GFR = 45-59 mL/min/1 73 square meters)  •  Stage 3B Moderate CKD (GFR = 30-44 mL/min/1 73 square meters)  •  Stage 4 Severe CKD (GFR = 15-29 mL/min/1 73 square meters)  •  Stage 5 End Stage CKD (GFR <15 mL/min/1 73 square meters)  Note: GFR calculation is accurate only with a steady state creatinine   LIPASE - Abnormal    Lipase 450 (*) 73 - 393 u/L Final   AMYLASE - Abnormal    Amylase 120 (*) 25 - 115 IU/L Final   LIPID PANEL WITH DIRECT LDL REFLEX - Abnormal    Cholesterol 139  See Comment mg/dL Final    Comment: Cholesterol:         Pediatric <18 Years        Desirable          <170 mg/dL      Borderline High    170-199 mg/dL      High               >=200 mg/dL        Adult >=18 Years            Desirable         <200 mg/dL Borderline High   200-239 mg/dL      High              >239 mg/dL      Triglycerides 56  See Comment mg/dL Final    Comment: Triglyceride:     0-9Y            <75mg/dL     10Y-17Y         <90 mg/dL       >=18Y     Normal          <150 mg/dL     Borderline High 150-199 mg/dL     High            200-499 mg/dL        Very High       >499 mg/dL    Specimen collection should occur prior to N-Acetylcysteine or Metamizole administration due to the potential for falsely depressed results  HDL, Direct 38 (*) >=40 mg/dL Final    Comment: Specimen collection should occur prior to Metamizole administration due to the potential for falsley depressed results  LDL Calculated 90  0 - 100 mg/dL Final    Comment: LDL Cholesterol:     Optimal           <100 mg/dl     Near Optimal      100-129 mg/dl     Above Optimal       Borderline High 130-159 mg/dl       High            160-189 mg/dl       Very High       >189 mg/dl         This screening LDL is a calculated result  It does not have the accuracy of the Direct Measured LDL in the monitoring of patients with hyperlipidemia and/or statin therapy  Direct Measure LDL (ECL365) must be ordered separately in these patients  POCT GLUCOSE - Abnormal    POC Glucose 217 (*) 65 - 140 mg/dl Final       On reassessment, patient unable to tolerate p o ,  No peritonitis on examination, admitted to medicine for further work-up  Patient is stable for admission at this point    DIAGNOSIS:  Acute abdominal pain, acute pancreatitis, acute nausea and vomiting, inability to tolerate oral intake    MEDICAL DECISION MAKING CODING    The differential diagnosis before testing included (but is not limited to) acute nonstick abdominal pain, acute pancreatitis, acute viral illness and acute severe hypokalemia from vomiting which is a medical condition that poses a threat to life/function      Patient presents with acute new problem with:  Threat to life or bodily function      Chronic conditions affecting care:  As per HPI    COLLECTION AND INTERPRETATION OF DATA  Additional history obtained from: None  I reviewed prior external notes, including 6/26/2020 ECG    I ordered each unique test  Tests reviewed personally by me:  ECG: See my ED course  Labs: See above    Discussion with other providers: Admitting medicine physician    RISK    Treatment:  Patient admitted          Critical Care Time  Procedures

## 2023-06-05 NOTE — ASSESSMENT & PLAN NOTE
· Patient is on Aldactazide 25/25 and Lotrel  5-20 at home  · Resume same  · On presentation have been noted to have significantly elevated blood pressure likely due to pain  · We will order Lopressor as needed for systolic blood pressure above 180

## 2023-06-05 NOTE — ASSESSMENT & PLAN NOTE
Lab Results   Component Value Date    HGBA1C 7 0 (H) 04/06/2023       Recent Labs     06/05/23  0840 06/05/23  1551   POCGLU 217* 191*       Blood Sugar Average: Last 72 hrs:  (P) 204     · Patient is on metformin, glipizide and Januvia at home  · Will start on sliding scale insulin while inpatient  Can also start on Lantus once diet resumed  · Continue hypoglycemia protocol  · Continue Lyrica at home dosage

## 2023-06-05 NOTE — ASSESSMENT & PLAN NOTE
· Patient is on atorvastatin at home that he has stopped taking, will resume while inpatient  · He was encouraged on being compliant with his medications

## 2023-06-05 NOTE — ASSESSMENT & PLAN NOTE
· Patient's BMI 40  87  · Encouraged on lifestyle modification including diet changes and regular exercise, per patient he tries to exercise every day but have been noncompliant with diet changes

## 2023-06-05 NOTE — ASSESSMENT & PLAN NOTE
· Patient presented with epigastric pain, radiating to back and lower abdomen associated with vomiting  Suspected pancreatitis however he does not meet 2 out of 3 criteria for pancreatitis, pending CT imaging  · Lipase and amylase slightly elevated above normal range  · Will order CT abdomen pelvis as he reports feeling bloated and is not able to pass gas, to rule out obstruction versus pancreatitis  · Will obtain lipid panel to rule out triglyceride induced pancreatitis  · Keep n p o  for now  · Continue IV fluids  · Conservative management with Zofran, Mylanta and pain control

## 2023-06-05 NOTE — ED PROVIDER NOTES
History  Chief Complaint   Patient presents with   • Abdominal Pain     Pt woke up in the middle of the night with abdominal pain and nausea  Vomited several times  Patient is a 77-year-old male with past medical history significant for hypertension, hyperlipidemia, prior CVA, diabetes presenting to the emergency department for evaluation of epigastric abdominal pain associated with nausea and vomiting  Patient states he had multiple episodes of nausea and vomiting  Has not been able to take his medications today  He denies any fevers or chills chest pain shortness of breath diarrhea, constipation, dysuria, hematuria  Patient states his last bowel movement was yesterday  He denies any abdominal surgeries  Denies any blood in his stool or vomit  Prior to Admission Medications   Prescriptions Last Dose Informant Patient Reported? Taking? ASPIRIN 81 81 MG EC tablet  Self Yes No   Sig: TAKE 1 TABLET (81 MG) BY ORAL ROUTE ONCE DAILY   Blood Glucose Monitoring Suppl (ONE TOUCH ULTRA 2) w/Device KIT  Self Yes No   Sig: TEST BLOOD SUGAR LEVELS ONE TIME A DAY   Easy Comfort Pen Needles 31G X 6 MM MISC   No No   Sig: USE WITH INSULIN PEN   Januvia 100 MG tablet   Yes No   Sig: TAKE 1 TABLET(S) EVERY DAY BY ORAL ROUTE WITH MEALS     [facility] 4 02 VL/2 6QD SOPN   No No   Sig: INJECT 0 5 ML (0 75 MG TOTAL) UNDER THE SKIN ONCE A WEEK   amLODIPine-benazepril (LOTREL 5-20) 5-20 MG per capsule 6/5/2023 Self Yes Yes   Sig: TAKE 1 CAPSULE BY ORAL ROUTE ONCE DAILY   ammonium lactate (LAC-HYDRIN) 12 % cream  Self No No   Sig: APPLY TOPICALLY 2 (TWO) TIMES A DAY AS NEEDED FOR DRY SKIN   atorvastatin (LIPITOR) 40 mg tablet  Self Yes No   Sig: Take 40 mg by mouth daily   clopidogrel (PLAVIX) 75 mg tablet  Self Yes No   Sig: Take 75 mg by mouth   glipiZIDE (GLUCOTROL XL) 10 mg 24 hr tablet   Yes No   Sig: TAKE 1 TABLET(S) EVERY DAY BY ORAL ROUTE WITH MEALS FOR 90 DAYS    glipiZIDE (GLUCOTROL) 10 mg tablet  Self Yes No Sig: Take 10 mg by mouth   ketoconazole (NIZORAL) 2 % cream   No No   Sig: Apply topically daily   metFORMIN (GLUCOPHAGE-XR) 500 mg 24 hr tablet   No No   Sig: TAKE 1 TABLET (500 MG TOTAL) BY MOUTH DAILY WITH BREAKFAST   polyethylene glycol (GLYCOLAX) powder   No No   Sig: At 5pm take 5mgx2 dulcolax  At 6pm 32oz miralax in 64oz gatorade  Drink 8oz glass every 5 mins until 32oz finished  Drink remaining as rec  Patient not taking: Reported on 5/10/2021   pregabalin (LYRICA) 75 mg capsule   Yes No   Sig: TAKE 1 CAPSULE 2 TIMES PER DAY   spironolactone-hydrochlorothiazide (ALDACTAZIDE) 25-25 mg per tablet   Yes No   Sig: TAKE 1 TABLET(S) EVERY DAY BY ORAL ROUTE IN THE MORNING   Facility-Administered Medications: None       Past Medical History:   Diagnosis Date   • Diabetes mellitus ([facility] [address] )    • Hypertension    • Stroke [facility])        History reviewed  No pertinent surgical history  History reviewed  No pertinent family history  I have reviewed and agree with the history as documented  E-Cigarette/Vaping   • E-Cigarette Use Current Some Day User    • Comments once a month      E-Cigarette/Vaping Substances   • Nicotine No    • THC Yes    • CBD Yes    • Flavoring Yes      Social History     Tobacco Use   • Smoking status: Some Days     Types: Cigars   • Smokeless tobacco: Never   • Tobacco comments:     cigar   Vaping Use   • Vaping Use: Some days   • Substances: THC, CBD, Flavoring   Substance Use Topics   • Alcohol use: Yes     Comment: occ   • Drug use: Never        Review of Systems   Constitutional: Positive for activity change and appetite change  Negative for chills, fatigue and fever  HENT: Negative for congestion, ear pain and sore throat  Eyes: Negative for pain and visual disturbance  Respiratory: Negative for cough, chest tightness and shortness of breath  Cardiovascular: Negative for chest pain and palpitations  Gastrointestinal: Positive for abdominal pain, nausea and vomiting  Negative for abdominal distention, blood in stool, constipation, diarrhea and rectal pain  Genitourinary: Negative for dysuria and hematuria  Musculoskeletal: Negative for arthralgias, back pain, neck pain and neck stiffness  Skin: Negative for color change and rash  Neurological: Positive for weakness  Negative for dizziness, seizures, syncope, light-headedness, numbness and headaches  Prior CVA left upper extremity weakness   Psychiatric/Behavioral: Negative for agitation and behavioral problems  All other systems reviewed and are negative  Physical Exam  ED Triage Vitals   Temperature Pulse Respirations Blood Pressure SpO2   06/05/23 0841 06/05/23 0841 06/05/23 0841 06/05/23 0841 06/05/23 0841   98 1 °F (36 7 °C) 80 21 (!) 206/124 98 %      Temp Source Heart Rate Source Patient Position - Orthostatic VS BP Location FiO2 (%)   06/05/23 0841 06/05/23 0841 06/05/23 0841 06/05/23 0841 --   Tympanic Monitor Lying Right arm       Pain Score       06/05/23 0836       10 - Worst Possible Pain             Orthostatic Vital Signs  Vitals:    06/05/23 1340 06/05/23 1423 06/05/23 1425 06/05/23 1432   BP: 154/88 (!) 208/105 (!) 190/102 (!) 188/102   Pulse: 93 94     Patient Position - Orthostatic VS: Lying Lying Lying Lying       Physical Exam  Vitals and nursing note reviewed  Constitutional:       General: He is not in acute distress  Appearance: He is well-developed  HENT:      Head: Normocephalic and atraumatic  Mouth/Throat:      Mouth: Mucous membranes are moist    Eyes:      Extraocular Movements: Extraocular movements intact  Conjunctiva/sclera: Conjunctivae normal       Pupils: Pupils are equal, round, and reactive to light  Cardiovascular:      Rate and Rhythm: Normal rate and regular rhythm  Heart sounds: Normal heart sounds  No murmur heard  Pulmonary:      Effort: Pulmonary effort is normal  No respiratory distress  Breath sounds: Normal breath sounds  Abdominal:      General: Abdomen is flat  Bowel sounds are increased  Palpations: Abdomen is soft  Tenderness: There is abdominal tenderness in the epigastric area  Musculoskeletal:         General: No swelling  Cervical back: Neck supple  Skin:     General: Skin is dry  Capillary Refill: Capillary refill takes less than 2 seconds  Neurological:      Mental Status: He is alert  Motor: Weakness present        Comments: Left upper extremity weakness   Psychiatric:         Mood and Affect: Mood normal          ED Medications  Medications   amLODIPine (NORVASC) tablet 5 mg (5 mg Oral Given 6/5/23 1336)     And   lisinopril (ZESTRIL) tablet 20 mg (20 mg Oral Given 6/5/23 1337)   ammonium lactate (LAC-HYDRIN) 12 % cream ( Topical Not Given 6/5/23 1338)   aspirin (ECOTRIN LOW STRENGTH) EC tablet 81 mg (81 mg Oral Given 6/5/23 1337)   atorvastatin (LIPITOR) tablet 40 mg (40 mg Oral Given 6/5/23 1337)   clopidogrel (PLAVIX) tablet 75 mg (75 mg Oral Given 6/5/23 1337)   pregabalin (LYRICA) capsule 75 mg (75 mg Oral Given 6/5/23 1337)   acetaminophen (TYLENOL) tablet 650 mg (650 mg Oral Given 6/5/23 1332)   aluminum-magnesium hydroxide-simethicone (MYLANTA) oral suspension 30 mL (30 mL Oral Given 6/5/23 1115)   docusate sodium (COLACE) capsule 100 mg (0 mg Oral Hold 6/5/23 1810)   senna (SENOKOT) tablet 8 6 mg (8 6 mg Oral Given 6/5/23 1337)   ondansetron (ZOFRAN) injection 4 mg (4 mg Intravenous Given 6/5/23 1449)   nicotine (NICODERM CQ) 7 mg/24hr TD 24 hr patch 1 patch (1 patch Transdermal Not Given 6/5/23 1338)   enoxaparin (LOVENOX) subcutaneous injection 40 mg (40 mg Subcutaneous Given 6/5/23 1331)   multi-electrolyte (PLASMALYTE-A/ISOLYTE-S PH 7 4) IV solution (100 mL/hr Intravenous New Bag 6/5/23 1339)   spironolactone (ALDACTONE) tablet 25 mg (25 mg Oral Given 6/5/23 1337)     And   hydrochlorothiazide (HYDRODIURIL) tablet 25 mg (25 mg Oral Given 6/5/23 6630)   oxyCODONE (ROXICODONE) IR tablet 5 mg (has no administration in time range)   oxyCODONE (ROXICODONE) immediate release tablet 10 mg (10 mg Oral Given 6/5/23 1752)   metoprolol (LOPRESSOR) injection 2 5 mg (2 5 mg Intravenous Given 6/5/23 1444)   HYDROmorphone (DILAUDID) injection 0 5 mg (0 5 mg Intravenous Given 6/5/23 1449)   insulin lispro (HumaLOG) 100 units/mL subcutaneous injection 2-12 Units (2 Units Subcutaneous Given 6/5/23 1752)   insulin lispro (HumaLOG) 100 units/mL subcutaneous injection 1-6 Units (2 Units Subcutaneous Not Given 6/5/23 1750)   multi-electrolyte (ISOLYTE-S PH 7 4) bolus 500 mL (0 mL Intravenous Stopped 6/5/23 0937)   ondansetron (ZOFRAN) injection 4 mg (4 mg Intravenous Given 6/5/23 0836)   HYDROmorphone (DILAUDID) injection 0 5 mg (0 5 mg Intravenous Given 6/5/23 0836)   multi-electrolyte (ISOLYTE-S PH 7 4) bolus 500 mL (0 mL Intravenous Stopped 6/5/23 1338)   iohexol (OMNIPAQUE) 350 MG/ML injection (SINGLE-DOSE) 100 mL (100 mL Intravenous Given 6/5/23 1933)       Diagnostic Studies  Results Reviewed     Procedure Component Value Units Date/Time    Lipid Panel with Direct LDL reflex [[id number]]  (Abnormal) Collected: 06/05/23 0838    Lab Status: Final result Specimen: Blood from Arm, Left Updated: 06/05/23 1149     Cholesterol 139 mg/dL      Triglycerides 56 mg/dL      HDL, Direct 38 mg/dL      LDL Calculated 90 mg/dL     Amylase [[id number]]  (Abnormal) Collected: 06/05/23 0838    Lab Status: Final result Specimen: Blood from Arm, Left Updated: 06/05/23 1103     Amylase 120 IU/L     Comprehensive metabolic panel [[id number]]  (Abnormal) Collected: 06/05/23 0838    Lab Status: Final result Specimen: Blood from Arm, Left Updated: 06/05/23 0923     Sodium 133 mmol/L      Potassium 4 3 mmol/L      Chloride 107 mmol/L      CO2 24 mmol/L      ANION GAP 2 mmol/L      BUN 12 mg/dL      Creatinine 1 04 mg/dL      Glucose 191 mg/dL      Calcium 8 9 mg/dL      Corrected Calcium 9 5 mg/dL      AST 22 U/L      ALT 27 U/L Alkaline Phosphatase 69 U/L      Total Protein 8 3 g/dL      Albumin 3 3 g/dL      Total Bilirubin 0 34 mg/dL      eGFR 79 ml/min/1 73sq m     Narrative:      National Kid[employer] guidelines for Chronic Kidney Disease (CKD):   •  Stage 1 with normal or high GFR (GFR > 90 mL/min/1 73 square meters)  •  Stage 2 Mild CKD (GFR = 60-89 mL/min/1 73 square meters)  •  Stage 3A Moderate CKD (GFR = 45-59 mL/min/1 73 square meters)  •  Stage 3B Moderate CKD (GFR = 30-44 mL/min/1 73 square meters)  •  Stage 4 Severe CKD (GFR = 15-29 mL/min/1 73 square meters)  •  Stage 5 End Stage CKD (GFR <15 mL/min/1 73 square meters)  Note: GFR calculation is accurate only with a steady state creatinine    Lipase [[id number]]  (Abnormal) Collected: 06/05/23 0838    Lab Status: Final result Specimen: Blood from Arm, Left Updated: 06/05/23 0923     Lipase 450 u/L     CBC and differential [[id number]]  (Abnormal) Collected: 06/05/23 0838    Lab Status: Final result Specimen: Blood from Arm, Left Updated: 06/05/23 0847     WBC 10 34 Thousand/uL      RBC 4 93 Million/uL      Hemoglobin 14 3 g/dL      Hematocrit 43 0 %      MCV 87 fL      MCH 29 0 pg      MCHC 33 3 g/dL      RDW 12 9 %      MPV 11 0 fL      Platelets 064 Thousands/uL      nRBC 0 /100 WBCs      Neutrophils Relative 84 %      Immat GRANS % 0 %      Lymphocytes Relative 12 %      Monocytes Relative 4 %      Eosinophils Relative 0 %      Basophils Relative 0 %      Neutrophils Absolute 8 64 Thousands/µL      Immature Grans Absolute 0 04 Thousand/uL      Lymphocytes Absolute 1 26 Thousands/µL      Monocytes Absolute 0 36 Thousand/µL      Eosinophils Absolute 0 01 Thousand/µL      Basophils Absolute 0 03 Thousands/µL     Fingerstick Glucose (POCT) [[id number]]  (Abnormal) Collected: 06/05/23 0840    Lab Status: Final result Updated: 06/05/23 0843     POC Glucose 217 mg/dl                  CT abdomen pelvis w contrast    (Results Pending)         Procedures  ECG 12 Lead Documentation Only    Date/Time: 6/5/2023 8:29 AM    Performed by: Justyna Pabon DO  Authorized by: Justyna Pabon DO    Indications / Diagnosis:  N/v  ECG reviewed by me, the ED Provider: yes    Patient location:  ED  Previous ECG:     Previous ECG:  Compared to current    Similarity:  No change  Interpretation:     Interpretation: abnormal    Rate:     ECG rate:  80    ECG rate assessment: normal    Ectopy:     Ectopy: bigeminy      Ectopy comment:  Episodic bigeminy  QRS:     QRS axis:  Normal    QRS intervals:  Normal  Conduction:     Conduction: normal    ST segments:     ST segments:  Normal  T waves:     T waves: normal            ED Course  ED Course as of 06/05/23 2043 Mon Jun 05, 2023   0847 POC Glucose(!): 217   0924 Lipase(!): 450  pancreatitis   5758 Patient with continued pain, N/V infomred of all lab findings  Will admit  Patient aware of pancetatitis  reaching out to slim                                       Medical Decision Making  Patient is a 61-year-old male presenting to the emergency department for evaluation of epigastric abdominal pain nausea vomiting  On exam patient has epigastric tenderness to palpation  Patient is actively dry heaving and nauseous vomiting  He denies any alcohol or drug use  Vitals showed patient is hypertensive as well as tachypneic  Differential diagnosis includes but is not limited to pancreatitis, electrolyte abnormalities, acute viral illness, DKA  Also treated with Zofran, Isolyte and Dilaudid  CBC, CMP, lipase, EKG performed  P o  challenge failed  Patient was unable to tolerate p o  Decision was made to admit patient to [facility] for acute pancreatitis  Patient admitted he had no questions or concerns he was aware of all  laboratory findings  Amount and/or Complexity of Data Reviewed  Labs: ordered  Decision-making details documented in ED Course  Risk  Prescription drug management    Decision regarding hospitalization              Disposition  Final diagnoses:   Abdominal pain   Nausea and vomiting   Acute pancreatitis     Time reflects when diagnosis was documented in both MDM as applicable and the Disposition within this note     Time User Action Codes Description Comment    6/5/2023 10:14 AM Alexandr Huffman Add [R10 9] Abdominal pain     6/5/2023 10:14 AM Shireen Lea Add [R11 2] Nausea and vomiting     6/5/2023 10:14 AM Alexandr Huffman Add [K85 90] Acute pancreatitis       ED Disposition     ED Disposition   Admit    Condition   Stable    Date/Time   Mon Jun 5, 2023 10:14 AM    Comment   Case was discussed with Dr Jeong         Follow-up Information    None         Current Discharge Medication List      CONTINUE these medications which have NOT CHANGED    Details   amLODIPine-benazepril (LOTREL 5-20) 5-20 MG per capsule TAKE 1 CAPSULE BY ORAL ROUTE ONCE DAILY      ammonium lactate (LAC-HYDRIN) 12 % cream APPLY TOPICALLY 2 (TWO) TIMES A DAY AS NEEDED FOR DRY SKIN  Qty: 385 g, Refills: 3    Associated Diagnoses: Xerosis cutis      ASPIRIN 81 81 MG EC tablet TAKE 1 TABLET (81 MG) BY ORAL ROUTE ONCE DAILY      atorvastatin (LIPITOR) 40 mg tablet Take 40 mg by mouth daily      Blood Glucose Monitoring Suppl (ONE TOUCH ULTRA 2) w/Device KIT TEST BLOOD SUGAR LEVELS ONE TIME A DAY      clopidogrel (PLAVIX) 75 mg tablet Take 75 mg by mouth      Easy Comfort Pen Needles 31G X 6 MM MISC USE WITH INSULIN PEN  Qty: 100 each, Refills: 0    Associated Diagnoses: Type 2 diabetes mellitus with hyperglycemia, without long-term current use of insulin ([facility])      glipiZIDE (GLUCOTROL XL) 10 mg 24 hr tablet TAKE 1 TABLET(S) EVERY DAY BY ORAL ROUTE WITH MEALS FOR 90 DAYS       glipiZIDE (GLUCOTROL) 10 mg tablet Take 10 mg by mouth      Januvia 100 MG tablet TAKE 1 TABLET(S) EVERY DAY BY ORAL ROUTE WITH MEALS       ketoconazole (NIZORAL) 2 % cream Apply topically daily  Qty: 60 g, Refills: 1    Associated Diagnoses: Tinea pedis of both feet      metFORMIN (GLUCOPHAGE-XR) 500 mg 24 hr tablet TAKE 1 TABLET (500 MG TOTAL) BY MOUTH DAILY WITH BREAKFAST  Qty: 30 tablet, Refills: 4    Associated Diagnoses: Type 2 diabetes mellitus with hyperglycemia, without long-term current use of insulin (HCC)      polyethylene glycol (GLYCOLAX) powder At 5pm take 5mgx2 dulcolax  At 6pm 32oz miralax in 64oz gatorade  Drink 8oz glass every 5 mins until 32oz finished  Drink remaining as rec  Qty: 238 g, Refills: 0    Associated Diagnoses: Screening for colon cancer      pregabalin (LYRICA) 75 mg capsule TAKE 1 CAPSULE 2 TIMES PER DAY      spironolactone-hydrochlorothiazide (ALDACTAZIDE) 25-25 mg per tablet TAKE 1 TABLET(S) EVERY DAY BY ORAL ROUTE IN THE MORNING   Trulicity 0 35 TO/5 2YJ SOPN INJECT 0 5 ML (0 75 MG TOTAL) UNDER THE SKIN ONCE A WEEK  Qty: 2 mL, Refills: 0    Associated Diagnoses: Type 2 diabetes mellitus with hyperglycemia, without long-term current use of insulin (HCC)           No discharge procedures on file  PDMP Review     None           ED Provider  Attending physically available and evaluated Pauline Romano I managed the patient along with the ED Attending      Electronically Signed by         Shoaib Davies DO  06/05/23 2047

## 2023-06-06 ENCOUNTER — APPOINTMENT (INPATIENT)
Dept: RADIOLOGY | Facility: HOSPITAL | Age: 58
DRG: 417 | End: 2023-06-06
Payer: COMMERCIAL

## 2023-06-06 LAB
ALBUMIN SERPL BCP-MCNC: 3.1 G/DL (ref 3.5–5)
ALBUMIN SERPL BCP-MCNC: 3.2 G/DL (ref 3.5–5)
ALP SERPL-CCNC: 68 U/L (ref 46–116)
ALP SERPL-CCNC: 69 U/L (ref 46–116)
ALT SERPL W P-5'-P-CCNC: 72 U/L (ref 12–78)
ALT SERPL W P-5'-P-CCNC: 73 U/L (ref 12–78)
ANION GAP SERPL CALCULATED.3IONS-SCNC: 2 MMOL/L (ref 4–13)
AST SERPL W P-5'-P-CCNC: 51 U/L (ref 5–45)
AST SERPL W P-5'-P-CCNC: 53 U/L (ref 5–45)
BASOPHILS # BLD AUTO: 0.02 THOUSANDS/ÂΜL (ref 0–0.1)
BASOPHILS # BLD AUTO: 0.03 THOUSANDS/ÂΜL (ref 0–0.1)
BASOPHILS NFR BLD AUTO: 0 % (ref 0–1)
BASOPHILS NFR BLD AUTO: 0 % (ref 0–1)
BILIRUB DIRECT SERPL-MCNC: 0.24 MG/DL (ref 0–0.2)
BILIRUB SERPL-MCNC: 0.76 MG/DL (ref 0.2–1)
BILIRUB SERPL-MCNC: 0.77 MG/DL (ref 0.2–1)
BUN SERPL-MCNC: 12 MG/DL (ref 5–25)
CALCIUM ALBUM COR SERPL-MCNC: 9.5 MG/DL (ref 8.3–10.1)
CALCIUM SERPL-MCNC: 8.8 MG/DL (ref 8.3–10.1)
CHLORIDE SERPL-SCNC: 101 MMOL/L (ref 96–108)
CO2 SERPL-SCNC: 29 MMOL/L (ref 21–32)
CREAT SERPL-MCNC: 0.87 MG/DL (ref 0.6–1.3)
EOSINOPHIL # BLD AUTO: 0 THOUSAND/ÂΜL (ref 0–0.61)
EOSINOPHIL # BLD AUTO: 0.01 THOUSAND/ÂΜL (ref 0–0.61)
EOSINOPHIL NFR BLD AUTO: 0 % (ref 0–6)
EOSINOPHIL NFR BLD AUTO: 0 % (ref 0–6)
ERYTHROCYTE [DISTWIDTH] IN BLOOD BY AUTOMATED COUNT: 13.2 % (ref 11.6–15.1)
ERYTHROCYTE [DISTWIDTH] IN BLOOD BY AUTOMATED COUNT: 13.3 % (ref 11.6–15.1)
GFR SERPL CREATININE-BSD FRML MDRD: 96 ML/MIN/1.73SQ M
GLUCOSE SERPL-MCNC: 130 MG/DL (ref 65–140)
GLUCOSE SERPL-MCNC: 170 MG/DL (ref 65–140)
GLUCOSE SERPL-MCNC: 173 MG/DL (ref 65–140)
GLUCOSE SERPL-MCNC: 174 MG/DL (ref 65–140)
GLUCOSE SERPL-MCNC: 175 MG/DL (ref 65–140)
GLUCOSE SERPL-MCNC: 175 MG/DL (ref 65–140)
GLUCOSE SERPL-MCNC: 185 MG/DL (ref 65–140)
HCT VFR BLD AUTO: 42.4 % (ref 36.5–49.3)
HCT VFR BLD AUTO: 45.3 % (ref 36.5–49.3)
HGB BLD-MCNC: 14.3 G/DL (ref 12–17)
HGB BLD-MCNC: 14.9 G/DL (ref 12–17)
IMM GRANULOCYTES # BLD AUTO: 0.09 THOUSAND/UL (ref 0–0.2)
IMM GRANULOCYTES # BLD AUTO: 0.14 THOUSAND/UL (ref 0–0.2)
IMM GRANULOCYTES NFR BLD AUTO: 1 % (ref 0–2)
IMM GRANULOCYTES NFR BLD AUTO: 1 % (ref 0–2)
LYMPHOCYTES # BLD AUTO: 1.58 THOUSANDS/ÂΜL (ref 0.6–4.47)
LYMPHOCYTES # BLD AUTO: 1.64 THOUSANDS/ÂΜL (ref 0.6–4.47)
LYMPHOCYTES NFR BLD AUTO: 8 % (ref 14–44)
LYMPHOCYTES NFR BLD AUTO: 9 % (ref 14–44)
MCH RBC QN AUTO: 28.3 PG (ref 26.8–34.3)
MCH RBC QN AUTO: 28.7 PG (ref 26.8–34.3)
MCHC RBC AUTO-ENTMCNC: 32.9 G/DL (ref 31.4–37.4)
MCHC RBC AUTO-ENTMCNC: 33.7 G/DL (ref 31.4–37.4)
MCV RBC AUTO: 85 FL (ref 82–98)
MCV RBC AUTO: 86 FL (ref 82–98)
MONOCYTES # BLD AUTO: 1.56 THOUSAND/ÂΜL (ref 0.17–1.22)
MONOCYTES # BLD AUTO: 1.76 THOUSAND/ÂΜL (ref 0.17–1.22)
MONOCYTES NFR BLD AUTO: 8 % (ref 4–12)
MONOCYTES NFR BLD AUTO: 9 % (ref 4–12)
NEUTROPHILS # BLD AUTO: 15.86 THOUSANDS/ÂΜL (ref 1.85–7.62)
NEUTROPHILS # BLD AUTO: 16.39 THOUSANDS/ÂΜL (ref 1.85–7.62)
NEUTS SEG NFR BLD AUTO: 82 % (ref 43–75)
NEUTS SEG NFR BLD AUTO: 82 % (ref 43–75)
NRBC BLD AUTO-RTO: 0 /100 WBCS
NRBC BLD AUTO-RTO: 0 /100 WBCS
PLATELET # BLD AUTO: 223 THOUSANDS/UL (ref 149–390)
PLATELET # BLD AUTO: 224 THOUSANDS/UL (ref 149–390)
PMV BLD AUTO: 11 FL (ref 8.9–12.7)
PMV BLD AUTO: 11.4 FL (ref 8.9–12.7)
POTASSIUM SERPL-SCNC: 3.8 MMOL/L (ref 3.5–5.3)
PROT SERPL-MCNC: 8.1 G/DL (ref 6.4–8.4)
PROT SERPL-MCNC: 8.2 G/DL (ref 6.4–8.4)
RBC # BLD AUTO: 4.98 MILLION/UL (ref 3.88–5.62)
RBC # BLD AUTO: 5.26 MILLION/UL (ref 3.88–5.62)
SODIUM SERPL-SCNC: 132 MMOL/L (ref 135–147)
WBC # BLD AUTO: 19.23 THOUSAND/UL (ref 4.31–10.16)
WBC # BLD AUTO: 19.85 THOUSAND/UL (ref 4.31–10.16)

## 2023-06-06 PROCEDURE — 82948 REAGENT STRIP/BLOOD GLUCOSE: CPT

## 2023-06-06 PROCEDURE — 80053 COMPREHEN METABOLIC PANEL: CPT | Performed by: STUDENT IN AN ORGANIZED HEALTH CARE EDUCATION/TRAINING PROGRAM

## 2023-06-06 PROCEDURE — 99223 1ST HOSP IP/OBS HIGH 75: CPT | Performed by: SURGERY

## 2023-06-06 PROCEDURE — 85025 COMPLETE CBC W/AUTO DIFF WBC: CPT | Performed by: PHYSICIAN ASSISTANT

## 2023-06-06 PROCEDURE — 85025 COMPLETE CBC W/AUTO DIFF WBC: CPT | Performed by: STUDENT IN AN ORGANIZED HEALTH CARE EDUCATION/TRAINING PROGRAM

## 2023-06-06 PROCEDURE — 76705 ECHO EXAM OF ABDOMEN: CPT

## 2023-06-06 PROCEDURE — 80076 HEPATIC FUNCTION PANEL: CPT | Performed by: INTERNAL MEDICINE

## 2023-06-06 PROCEDURE — NC001 PR NO CHARGE: Performed by: SURGERY

## 2023-06-06 PROCEDURE — 99232 SBSQ HOSP IP/OBS MODERATE 35: CPT | Performed by: INTERNAL MEDICINE

## 2023-06-06 PROCEDURE — 94760 N-INVAS EAR/PLS OXIMETRY 1: CPT

## 2023-06-06 RX ORDER — ACETAMINOPHEN 325 MG/1
975 TABLET ORAL EVERY 8 HOURS SCHEDULED
Status: DISCONTINUED | OUTPATIENT
Start: 2023-06-06 | End: 2023-06-10 | Stop reason: HOSPADM

## 2023-06-06 RX ORDER — CEFAZOLIN SODIUM 2 G/50ML
2000 SOLUTION INTRAVENOUS EVERY 8 HOURS
Status: DISCONTINUED | OUTPATIENT
Start: 2023-06-06 | End: 2023-06-07

## 2023-06-06 RX ORDER — METRONIDAZOLE 500 MG/1
500 TABLET ORAL EVERY 8 HOURS SCHEDULED
Status: DISCONTINUED | OUTPATIENT
Start: 2023-06-06 | End: 2023-06-07

## 2023-06-06 RX ADMIN — INSULIN LISPRO 1 UNITS: 100 INJECTION, SOLUTION INTRAVENOUS; SUBCUTANEOUS at 02:06

## 2023-06-06 RX ADMIN — OXYCODONE HYDROCHLORIDE 10 MG: 10 TABLET ORAL at 07:37

## 2023-06-06 RX ADMIN — PREGABALIN 75 MG: 75 CAPSULE ORAL at 21:09

## 2023-06-06 RX ADMIN — HYDROCHLOROTHIAZIDE 25 MG: 25 TABLET ORAL at 07:37

## 2023-06-06 RX ADMIN — AMLODIPINE BESYLATE 5 MG: 5 TABLET ORAL at 07:38

## 2023-06-06 RX ADMIN — INSULIN LISPRO 2 UNITS: 100 INJECTION, SOLUTION INTRAVENOUS; SUBCUTANEOUS at 12:09

## 2023-06-06 RX ADMIN — ATORVASTATIN CALCIUM 40 MG: 40 TABLET, FILM COATED ORAL at 07:38

## 2023-06-06 RX ADMIN — DOCUSATE SODIUM 100 MG: 100 CAPSULE, LIQUID FILLED ORAL at 17:16

## 2023-06-06 RX ADMIN — SENNOSIDES 8.6 MG: 8.6 TABLET, FILM COATED ORAL at 07:37

## 2023-06-06 RX ADMIN — ENOXAPARIN SODIUM 40 MG: 40 INJECTION SUBCUTANEOUS at 07:38

## 2023-06-06 RX ADMIN — OXYCODONE HYDROCHLORIDE 10 MG: 10 TABLET ORAL at 21:15

## 2023-06-06 RX ADMIN — METRONIDAZOLE 500 MG: 500 TABLET ORAL at 13:59

## 2023-06-06 RX ADMIN — OXYCODONE HYDROCHLORIDE 10 MG: 10 TABLET ORAL at 12:09

## 2023-06-06 RX ADMIN — CEFAZOLIN SODIUM 2000 MG: 2 SOLUTION INTRAVENOUS at 12:23

## 2023-06-06 RX ADMIN — ACETAMINOPHEN 975 MG: 325 TABLET, FILM COATED ORAL at 21:08

## 2023-06-06 RX ADMIN — CEFAZOLIN SODIUM 2000 MG: 2 SOLUTION INTRAVENOUS at 21:08

## 2023-06-06 RX ADMIN — INSULIN LISPRO 2 UNITS: 100 INJECTION, SOLUTION INTRAVENOUS; SUBCUTANEOUS at 00:23

## 2023-06-06 RX ADMIN — SODIUM CHLORIDE, SODIUM GLUCONATE, SODIUM ACETATE, POTASSIUM CHLORIDE, MAGNESIUM CHLORIDE, SODIUM PHOSPHATE, DIBASIC, AND POTASSIUM PHOSPHATE 100 ML/HR: .53; .5; .37; .037; .03; .012; .00082 INJECTION, SOLUTION INTRAVENOUS at 06:43

## 2023-06-06 RX ADMIN — METRONIDAZOLE 500 MG: 500 TABLET ORAL at 21:08

## 2023-06-06 RX ADMIN — INSULIN LISPRO 2 UNITS: 100 INJECTION, SOLUTION INTRAVENOUS; SUBCUTANEOUS at 06:38

## 2023-06-06 RX ADMIN — LISINOPRIL 20 MG: 20 TABLET ORAL at 07:38

## 2023-06-06 RX ADMIN — OXYCODONE HYDROCHLORIDE 10 MG: 10 TABLET ORAL at 03:14

## 2023-06-06 RX ADMIN — SPIRONOLACTONE 25 MG: 25 TABLET ORAL at 07:37

## 2023-06-06 RX ADMIN — PREGABALIN 75 MG: 75 CAPSULE ORAL at 07:37

## 2023-06-06 RX ADMIN — ASPIRIN 81 MG: 81 TABLET, COATED ORAL at 07:37

## 2023-06-06 RX ADMIN — INSULIN LISPRO 2 UNITS: 100 INJECTION, SOLUTION INTRAVENOUS; SUBCUTANEOUS at 23:07

## 2023-06-06 RX ADMIN — DOCUSATE SODIUM 100 MG: 100 CAPSULE, LIQUID FILLED ORAL at 07:37

## 2023-06-06 RX ADMIN — ACETAMINOPHEN 975 MG: 325 TABLET, FILM COATED ORAL at 13:59

## 2023-06-06 RX ADMIN — SODIUM CHLORIDE, SODIUM GLUCONATE, SODIUM ACETATE, POTASSIUM CHLORIDE, MAGNESIUM CHLORIDE, SODIUM PHOSPHATE, DIBASIC, AND POTASSIUM PHOSPHATE 100 ML/HR: .53; .5; .37; .037; .03; .012; .00082 INJECTION, SOLUTION INTRAVENOUS at 21:09

## 2023-06-06 RX ADMIN — CLOPIDOGREL BISULFATE 75 MG: 75 TABLET ORAL at 07:38

## 2023-06-06 RX ADMIN — OXYCODONE HYDROCHLORIDE 10 MG: 10 TABLET ORAL at 16:16

## 2023-06-06 NOTE — ASSESSMENT & PLAN NOTE
Lab Results   Component Value Date    HGBA1C 7 0 (H) 04/06/2023       Recent Labs     06/05/23  2346 06/06/23  0154 06/06/23  0614 06/06/23  1203   POCGLU 175* 175* 185* 170*       Blood Sugar Average: Last 72 hrs:  (P) 185 5     · Patient is on metformin, glipizide and Januvia at home  · Will start on sliding scale insulin while inpatient  Can also start on Lantus once diet resumed  · Continue hypoglycemia protocol  · Continue Lyrica at home dosage

## 2023-06-06 NOTE — PROGRESS NOTES
[address] [facility]  Progress Note  Name: Ahslyn Pope  MRN: [id number]  Unit/Bed#: [facility] 449-68 I Date of Admission: 6/5/2023   Date of Service: 6/6/2023  Hospital Day: 1    Assessment/Plan   * Epigastric pain  Assessment & Plan  · Patient presented with epigastric pain, radiating to back and lower abdomen associated with vomiting  · Likely 2/2 acute cholecystitis  · CT  Abdomen pelvis shows Gallbladder luminal distention with mild pericholecystic inflammation  No calcified gallstones  No biliary ductal dilation  Pancreas normal   · Lipase and amylase slightly elevated above normal range  · Lipid profile with normal TG level  · Keep n p o  for now  · Continue IV fluids  · Surgery consult>> patient will be transferred to primary surgical service for Mx of acute cholecystitis  Appreciate input  · Conservative management with Zofran, Mylanta and pain control  Primary hypertension  Assessment & Plan  · Patient is on Aldactazide 25/25 and Lotrel  5-20 at home  · Elevated BP due to pain  · continue home anti hypertensive regimen  · Adequate pain control      DM (diabetes mellitus), type 2 [facility])  Assessment & Plan  Lab Results   Component Value Date    HGBA1C 7 0 (H) 04/06/2023       Recent Labs     06/05/23  2346 06/06/23  0154 06/06/23  0614 06/06/23  1203   POCGLU 175* 175* 185* 170*       Blood Sugar Average: Last 72 hrs:  (P) 185 5     · Patient is on metformin, glipizide and Januvia at home  · Will start on sliding scale insulin while inpatient  Can also start on Lantus once diet resumed  · Continue hypoglycemia protocol  · Continue Lyrica at home dosage  PAD (peripheral artery disease) ([facility] [address] )  Assessment & Plan  · Patient is on aspirin, Plavix and statin at home that he has stopped taking as he ran out of prescription    · Continue aspirin, Plavix and statin while in hospital     CVA (cerebral vascular accident) [facility])  Assessment & Plan  History noted, patient has stopped taking aspirin and statin at home  Resumed while in hospital, encouraged on compliance  Hyperlipidemia  Assessment & Plan  · Patient is on atorvastatin at home that he has stopped taking, will resume while inpatient  · He was encouraged on being compliant with his medications  Morbid obesity with BMI of 40 0-44 9, adult ([address] )  Assessment & Plan  · Patient's BMI 40  87  · Encouraged on lifestyle modification including diet changes and regular exercise, per patient he tries to exercise every day but have been noncompliant with diet changes  Obstructive sleep apnea syndrome  Assessment & Plan  CPAP at nighttime  VTE Pharmacologic Prophylaxis:   Pharmacologic: Enoxaparin (Lovenox)  Mechanical VTE Prophylaxis in Place: Yes    Patient Centered Rounds: I have performed bedside rounds with nursing staff today  Discussions with Specialists or Other Care Team Provider: acute care surgery, CM    Education and Discussions with Family / Patient: plan of care, patient  He declined my offer to cqll   Time Spent for Care: 30 minutes  More than 50% of total time spent on counseling and coordination of care as described above  Current Length of Stay: 1 day(s)    Current Patient Status: Inpatient   Certification Statement: The patient will continue to require additional inpatient hospital stay due to not medically satble    Discharge Plan: when medically satble    Code Status: Level 1 - Full Code      Subjective:   No overnight events  still with RUQ pain  No fever or chills  Objective:     Vitals:   Temp (24hrs), Av 1 °F (36 7 °C), Min:97 8 °F (36 6 °C), Max:98 5 °F (36 9 °C)    Temp:  [97 8 °F (36 6 °C)-98 5 °F (36 9 °C)] 98 5 °F (36 9 °C)  HR:  [84-94] 84  Resp:  [14-24] 14  BP: (151-208)/() 151/78  SpO2:  [92 %-99 %] 92 %  Body mass index is 40 87 kg/m²  Input and Output Summary (last 24 hours):        Intake/Output Summary (Last 24 hours) at 2023 [address] filed at 6/6/2023 1223  Gross per 24 hour   Intake 400 ml   Output 1055 ml   Net -655 ml       Physical Exam:     Physical Exam  Constitutional:       General: He is not in acute distress  Cardiovascular:      Rate and Rhythm: Normal rate and regular rhythm  Heart sounds: Normal heart sounds  No murmur heard  Pulmonary:      Effort: No respiratory distress  Breath sounds: Normal breath sounds  No wheezing or rales  Abdominal:      General: Bowel sounds are normal  There is no distension  Palpations: Abdomen is soft  Tenderness: There is abdominal tenderness  Skin:     General: Skin is warm  Neurological:      Mental Status: He is alert  Comments: Awake alert and communicative  Baseline left sided weakness noted         Additional Data:     Labs:    Results from last 7 days   Lab Units 06/06/23  0552   EOS PCT % 0   HEMATOCRIT % 42 4   HEMOGLOBIN g/dL 14 3   LYMPHS PCT % 9*   MONOS PCT % 8   NEUTROS PCT % 82*   PLATELETS Thousands/uL 224   WBC Thousand/uL 19 23*     Results from last 7 days   Lab Units 06/06/23  0552   ANION GAP mmol/L 2*   ALBUMIN g/dL 3 2*  3 1*   ALK PHOS U/L 69  68   ALT U/L 73  72   AST U/L 51*  53*   BUN mg/dL 12   CALCIUM mg/dL 8 8   CHLORIDE mmol/L 101   CO2 mmol/L 29   CREATININE mg/dL 0 87   GLUCOSE RANDOM mg/dL 173*   POTASSIUM mmol/L 3 8   SODIUM mmol/L 132*   TOTAL BILIRUBIN mg/dL 0 77  0 76         Results from last 7 days   Lab Units 06/06/23  1203 06/06/23  0614 06/06/23  0154 06/05/23  2346 06/05/23  1551 06/05/23  0840   POC GLUCOSE mg/dl 170* 185* 175* 175* 191* 217*                   * I Have Reviewed All Lab Data Listed Above  * Additional Pertinent Lab Tests Reviewed:  All Labs Within Last 24 Hours Reviewed      Recent Cultures (last 7 days):           Last 24 Hours Medication List:   Current Facility-Administered Medications   Medication Dose Route Frequency Provider Last Rate   • acetaminophen  975 mg Oral Q8H Michelle Pac RIANNA Fowler     • aluminum-magnesium hydroxide-simethicone  30 mL Oral Q6H PRN Shireen Aiken MD     • amLODIPine  5 mg Oral Daily Shireen Aiken MD      And   • lisinopril  20 mg Oral Daily Shireen Aiken MD     • ammonium lactate   Topical Daily Shireen Aiken MD     • aspirin  81 mg Oral Daily Shireen Aiken MD     • atorvastatin  40 mg Oral Daily Shireen Aiken MD     • cefazolin  2,000 mg Intravenous Q8H Tone Fowler PA-C 2,000 mg (06/06/23 1223)   • clopidogrel  75 mg Oral Daily Shireen Aiken MD     • docusate sodium  100 mg Oral BID Shireen Aiken MD     • enoxaparin  40 mg Subcutaneous Daily Shireen Aiken MD     • spironolactone  25 mg Oral Daily Shireen Aiken MD      And   • hydrochlorothiazide  25 mg Oral Daily Shireen Aiken MD     • HYDROmorphone  0 5 mg Intravenous Q4H PRN Shireen Aiken MD     • insulin lispro  1-6 Units Subcutaneous 0200 Shireen Aiken MD     • insulin lispro  2-12 Units Subcutaneous Q6H Romel Benz MD     • metoprolol  2 5 mg Intravenous Q6H PRN Shireen Aiken MD     • metroNIDAZOLE  500 mg Oral [facility] Ann Bhatt PA-C     • multi-electrolyte  100 mL/hr Intravenous Continuous Shireen Aiken  mL/hr (06/06/23 4853)   • nicotine  1 patch Transdermal Daily Shireen Aiken MD     • ondansetron  4 mg Intravenous Q6H PRN Shireen Aiken MD     • oxyCODONE  10 mg Oral Q4H PRN Shireen Aiken MD     • oxyCODONE  5 mg Oral Q4H PRN Shireen Aiken MD     • pregabalin  75 mg Oral BID Shireen Aiken MD     • senna  1 tablet Oral Daily Shireen Aiken MD          Today, Patient Was Seen By: Anselmo Alas MD    ** Please Note: Dictation voice to text software may have been used in the creation of this document   **

## 2023-06-06 NOTE — ASSESSMENT & PLAN NOTE
· Patient is on Aldactazide 25/25 and Lotrel  5-20 at home    · Elevated BP due to pain  · continue home anti hypertensive regimen  · Adequate pain control

## 2023-06-06 NOTE — ASSESSMENT & PLAN NOTE
· Patient presented with epigastric pain, radiating to back and lower abdomen associated with vomiting  · Likely 2/2 acute cholecystitis  · CT  Abdomen pelvis shows Gallbladder luminal distention with mild pericholecystic inflammation  No calcified gallstones  No biliary ductal dilation  Pancreas normal   · Lipase and amylase slightly elevated above normal range  · Lipid profile with normal TG level  · Keep n p o  for now  · Continue IV fluids  · Surgery consult>> patient will be transferred to primary surgical service for Mx of acute cholecystitis  Appreciate input  · Conservative management with Zofran, Mylanta and pain control

## 2023-06-06 NOTE — CONSULTS
Acute Care Surgery  Consultation    Assessment and plan:    53-year-old male presented with diffuse abdominal pain that is now been constant for approximately 48 hours  He was admitted with suspected mild pancreatitis, but is more likely to have acute cholecystitis based on his symptoms, clinical exam findings and work-up to date  - Continue n p o  status for now except for sips of liquids with medications and noted for comfort   - Continue IV fluids for hydration and resuscitation   - Start IV antibiotics, Ancef and Flagyl, for treatment of suspected acute cholecystitis  - Obtain right upper quadrant ultrasound for further assessment of gallbladder, for presence of gallstones, and to evaluate the biliary ducts  - Continue to monitor labs including CBC, BMP and LFTs  - New leukocytosis noted on labs from 6/6/2023  No significant abnormality noted in total bilirubin or LFTs, but will need to continue monitoring   - Continue with multimodal analgesic regimen and adjust as needed to appropriately manage pain  - Tentative plan for laparoscopic cholecystectomy with intraoperative cholangiogram this hospital encounter; possibly on 6/7/2023 pending or availability   - Continue managing medical comorbidities including hypertension and type 2 diabetes mellitus  - Continue subcutaneous insulin regimen and adjust as indicated once tolerating oral diet  - Continue CPAP at night for obstructive sleep apnea  Discussed with internal medicine; will transfer patient to the general surgery service  History of Present Illness   HPI:  Allen Cartagena is a 66 y o  male who presents with significant diffuse abdominal pain that started the night prior to his presentation; now ongoing for approximately 48 hours  The patient notes that the pain has been persistent and is worst in his right upper abdomen with some radiation to his back  He has had loss of appetite as well as nausea and vomiting    He has had some diaphoretic episodes around the time of emesis, but denies any other fevers or chills  He denies any chest pain, shortness of breath or difficulty breathing, but notes that he does have worsened abdominal pain with deep breaths  He denies any change in his bowel habits or dysuria  He notes no prior similar episodes of pain or chronic abdominal issues  The patient also notes worsened abdominal pain with movement; the only alleviating factor has been IV pain medication  Review of Systems   Constitutional: Positive for appetite change (Loss of appetite) and diaphoresis  Negative for activity change, chills, fatigue, fever and unexpected weight change  HENT: Negative for congestion and sore throat  Eyes: Negative  Respiratory: Negative  Negative for cough, chest tightness, shortness of breath and wheezing  Cardiovascular: Negative  Negative for chest pain and leg swelling  Gastrointestinal: Positive for abdominal pain (Diffuse abdominal pain, worst in the right upper abdomen), nausea and vomiting  Negative for abdominal distention, constipation and diarrhea  Endocrine: Negative  Genitourinary: Negative  Negative for difficulty urinating, dysuria, flank pain, frequency and hematuria  Musculoskeletal: Positive for back pain (Some back pain radiating from abdomen)  Negative for arthralgias  Skin: Negative  Negative for color change, pallor, rash and wound  Allergic/Immunologic: Negative  Neurological: Negative  Negative for dizziness, syncope, weakness, light-headedness and headaches  Hematological: Negative  Psychiatric/Behavioral: Negative  Negative for agitation and confusion  Historical Information   Past Medical History:   Diagnosis Date   • Diabetes mellitus ([facility] [address] )    • Hypertension    • Stroke [facility])      History reviewed  No pertinent surgical history    Social History   Social History     Substance and Sexual Activity   Alcohol Use Yes    Comment: occ     Social History     Substance and Sexual Activity   Drug Use Never     Social History     Tobacco Use   Smoking Status Some Days   • Types: Cigars   Smokeless Tobacco Never   Tobacco Comments    cigar     History reviewed  No pertinent family history      Meds/Allergies   all current active meds have been reviewed and current meds:   Current Facility-Administered Medications   Medication Dose Route Frequency   • acetaminophen (TYLENOL) tablet 650 mg  650 mg Oral Q6H PRN   • aluminum-magnesium hydroxide-simethicone (MYLANTA) oral suspension 30 mL  30 mL Oral Q6H PRN   • amLODIPine (NORVASC) tablet 5 mg  5 mg Oral Daily    And   • lisinopril (ZESTRIL) tablet 20 mg  20 mg Oral Daily   • ammonium lactate (LAC-HYDRIN) 12 % cream   Topical Daily   • aspirin (ECOTRIN LOW STRENGTH) EC tablet 81 mg  81 mg Oral Daily   • atorvastatin (LIPITOR) tablet 40 mg  40 mg Oral Daily   • clopidogrel (PLAVIX) tablet 75 mg  75 mg Oral Daily   • docusate sodium (COLACE) capsule 100 mg  100 mg Oral BID   • enoxaparin (LOVENOX) subcutaneous injection 40 mg  40 mg Subcutaneous Daily   • spironolactone (ALDACTONE) tablet 25 mg  25 mg Oral Daily    And   • hydrochlorothiazide (HYDRODIURIL) tablet 25 mg  25 mg Oral Daily   • HYDROmorphone (DILAUDID) injection 0 5 mg  0 5 mg Intravenous Q4H PRN   • insulin lispro (HumaLOG) 100 units/mL subcutaneous injection 1-6 Units  1-6 Units Subcutaneous 0200   • insulin lispro (HumaLOG) 100 units/mL subcutaneous injection 2-12 Units  2-12 Units Subcutaneous Q6H [address]   • metoprolol (LOPRESSOR) injection 2 5 mg  2 5 mg Intravenous Q6H PRN   • multi-electrolyte (PLASMALYTE-A/ISOLYTE-S PH 7 4) IV solution  100 mL/hr Intravenous Continuous   • nicotine (NICODERM CQ) 7 mg/24hr TD 24 hr patch 1 patch  1 patch Transdermal Daily   • ondansetron (ZOFRAN) injection 4 mg  4 mg Intravenous Q6H PRN   • oxyCODONE (ROXICODONE) immediate release tablet 10 mg  10 mg Oral Q4H PRN   • oxyCODONE (ROXICODONE) IR tablet 5 mg  5 mg Oral Q4H "PRN   • pregabalin (LYRICA) capsule 75 mg  75 mg Oral BID   • senna (SENOKOT) tablet 8 6 mg  1 tablet Oral Daily     No Known Allergies    Objective   First Vitals:   Blood Pressure: (!) 206/124 (06/05/23 0841)  Pulse: 80 (06/05/23 0841)  Temperature: 98 1 °F (36 7 °C) (06/05/23 0841)  Temp Source: Tympanic (06/05/23 0841)  Respirations: 21 (06/05/23 0841)  Height: 6' 2\" (188 cm) (06/05/23 1416)  Weight - Scale: (!) 144 kg (318 lb 5 5 oz) (06/05/23 1416)  SpO2: 98 % (06/05/23 0841)    Current Vitals:   Blood Pressure: 151/78 (06/06/23 0720)  Pulse: 84 (06/06/23 0720)  Temperature: 98 5 °F (36 9 °C) (06/06/23 0720)  Temp Source: Tympanic (06/05/23 0841)  Respirations: 14 (06/06/23 0720)  Height: 6' 2\" (188 cm) (06/05/23 1416)  Weight - Scale: (!) 144 kg (318 lb 5 5 oz) (06/05/23 1416)  SpO2: 92 % (06/06/23 0720)      Intake/Output Summary (Last 24 hours) at 6/6/2023 1157  Last data filed at 6/6/2023 0900  Gross per 24 hour   Intake 900 ml   Output 805 ml   Net 95 ml       Invasive Devices     Peripheral Intravenous Line  Duration           Peripheral IV 06/05/23 Distal;Left;Upper;Ventral (anterior) Arm 1 day                Physical Exam  Vitals and nursing note reviewed  Exam conducted with a chaperone present  Constitutional:       General: He is awake  He is not in acute distress  Appearance: He is morbidly obese  He is ill-appearing  He is not toxic-appearing or diaphoretic  Interventions: He is not intubated  HENT:      Head: Normocephalic and atraumatic  Right Ear: External ear normal       Left Ear: External ear normal       Nose: Nose normal       Mouth/Throat:      Mouth: Mucous membranes are dry  Pharynx: Oropharynx is clear  Eyes:      Extraocular Movements: Extraocular movements intact  Conjunctiva/sclera: Conjunctivae normal    Cardiovascular:      Rate and Rhythm: Normal rate and regular rhythm  Pulses: Normal pulses             Radial pulses are 2+ on the right side and " 2+ on the left side  Dorsalis pedis pulses are 2+ on the right side and 2+ on the left side  Heart sounds: Normal heart sounds  No murmur heard  No friction rub  No gallop  Pulmonary:      Effort: Pulmonary effort is normal  No tachypnea, bradypnea, accessory muscle usage, prolonged expiration, respiratory distress or retractions  He is not intubated  Breath sounds: Normal breath sounds and air entry  No stridor or decreased air movement  No decreased breath sounds, wheezing, rhonchi or rales  Abdominal:      General: Bowel sounds are normal  There is no distension  Palpations: Abdomen is soft  Tenderness: There is generalized abdominal tenderness (Mild diffuse abdominal tenderness with moderate to severe right upper quadrant tenderness and focal voluntary guarding) and tenderness in the right upper quadrant  There is guarding (Voluntary guarding in the right upper quadrant)  There is no rebound  Positive signs include Mccauley's sign  Musculoskeletal:      Cervical back: Neck supple  Right lower leg: No edema  Left lower leg: No edema  Skin:     General: Skin is warm and dry  Capillary Refill: Capillary refill takes less than 2 seconds  Coloration: Skin is not jaundiced or pale  Findings: No bruising, erythema, lesion or rash  Neurological:      General: No focal deficit present  Mental Status: He is alert and oriented to person, place, and time  Mental status is at baseline  GCS: GCS eye subscore is 4  GCS verbal subscore is 5  GCS motor subscore is 6  Sensory: Sensation is intact  No sensory deficit  Motor: Weakness (Stable chronic left upper and lower extremity weakness following prior stroke in 2012) present  Psychiatric:         Mood and Affect: Mood normal          Behavior: Behavior is cooperative  Lab Results:   I have personally reviewed pertinent lab results    , CBC:   Lab Results   Component Value Date    HCT "42 4 06/06/2023    HGB 14 3 06/06/2023    MCH 28 7 06/06/2023    MCHC 33 7 06/06/2023    MCV 85 06/06/2023    MPV 11 4 06/06/2023    NRBC 0 06/06/2023     06/06/2023    RBC 4 98 06/06/2023    RDW 13 3 06/06/2023    WBC 19 23 (H) 06/06/2023   , CMP:   Lab Results   Component Value Date    ALKPHOS 68 06/06/2023    ALKPHOS 69 06/06/2023    ALT 72 06/06/2023    ALT 73 06/06/2023    AST 53 (H) 06/06/2023    AST 51 (H) 06/06/2023    BUN 12 06/06/2023    CALCIUM 8 8 06/06/2023     06/06/2023    CO2 29 06/06/2023    CREATININE 0 87 06/06/2023    EGFR 96 06/06/2023    K 3 8 06/06/2023    SODIUM 132 (L) 06/06/2023   , Coagulation: No results found for: \"APTT\", \"INR\", \"PT\", Urinalysis: No results found for: \"BILIRUBINUR\", \"BLOODU\", \"CLARITYU\", \"COLORU\", \"GLUCOSEU\", \"KETONESU\", \"LEUKOCYTESUR\", \"NITRITE\", \"PHUR\", \"PROTEINUA\", \"SPECGRAV\", Lipase: No results found for: \"LIPASE\"  Imaging: I have personally reviewed pertinent reports  and I have personally reviewed pertinent films in PACS  EKG, Pathology, and Other Studies: I have personally reviewed pertinent reports  Counseling / Coordination of Care  Total floor / unit time spent today 40 minutes  Greater than 50% of total time was spent with the patient and / or family counseling and / or coordination of care      Joleen Durand PA-C  6/6/2023 11:12 AM  "

## 2023-06-06 NOTE — UTILIZATION REVIEW
Initial Clinical Review    Admission: Date/Time/Statement:   Admission Orders (From admission, onward)     Ordered        06/05/23 1014  INPATIENT ADMISSION  Once                      Orders Placed This Encounter   Procedures   • INPATIENT ADMISSION     Standing Status:   Standing     Number of Occurrences:   1     Order Specific Question:   Level of Care     Answer:   Med Surg [16]     Order Specific Question:   Estimated length of stay     Answer:   More than 2 Midnights     Order Specific Question:   Certification     Answer:   I certify that inpatient services are medically necessary for this patient for a duration of greater than two midnights  See H&P and MD Progress Notes for additional information about the patient's course of treatment  ED Arrival Information     Expected   -    Arrival   6/5/2023 08:15    Acuity   Urgent            Means of arrival   Ambulance    Escorted by   [facility]    Service   Hospitalist    Admission type   Emergency            Arrival complaint   Nausea/Vom           Chief Complaint   Patient presents with   • Abdominal Pain     Pt woke up in the middle of the night with abdominal pain and nausea  Vomited several times  Initial Presentation: 66 y o  male with PMHx of HTN, HLD, PVD, CVA, T2 DM, EMRE, morbid obesity presents to ED by ems with epigastric pain, radiating to back and lower abdomen a/w vomiting starting last night and gradually worsening  Reports emesis x4, last alcohol intake was yesterday and drank one 1 shot  Admits to being noncompliant with his lipitor, asa and plavix  In ED /124  WBC 10 34, sodium 133, lipase 450, amylase 120  Admit inpatient to M/S unit with Epigastric Pain with suspected Pancreatitis -- npo  IVFs  CT AP ordered  Zofran, Mylanta and analgesics prn  Resume home Aldactazide and Lotrel  Metoprolol prn for SBP >180  Continue home lyrica  Accuchecks w/ ssi  Resume statin  CPAP @ hs  Consult surgery        Date: 6/6 Day 2: No overnight events  still with RUQ pain  BP still elevated at times  Continue po BP meds, appropriate pain control  Continue IVFs  Supportive care  Acute Surgery consult -- suspect acute cholecystitis based on his symptoms, clinical exam findings and work-up to date  Continue for now, except for sips of liquids with meds  Continue IVFs  Start IV antibiotics, Ancef and Flagyl for suspected acute cholecystitis  Obtain RUQ US  CBC, BMP and LFTs  New leukocytosis noted on labs from 6/6  No significant abnormality noted in Tbili or LFTs, but will need to continue monitoring  Continue multimodal analgesic regimen  Tentative plan for laparoscopic cholecystectomy with intraoperative cholangiogram possibly on 6/7 pending or availability  Date: 6/7    Day 3: Has surpassed a 2nd midnight with active treatments and services  Need for continued IV abx, IVFs   And plan for tent lap hemant      ED Triage Vitals   Temperature Pulse Respirations Blood Pressure SpO2   06/05/23 0841 06/05/23 0841 06/05/23 0841 06/05/23 0841 06/05/23 0841   98 1 °F (36 7 °C) 80 21 (!) 206/124 98 %      Temp Source Heart Rate Source Patient Position - Orthostatic VS BP Location FiO2 (%)   06/05/23 0841 06/05/23 0841 06/05/23 0841 06/05/23 0841 --   Tympanic Monitor Lying Right arm       Pain Score       06/05/23 0836       10 - Worst Possible Pain          Wt Readings from Last 1 Encounters:   06/05/23 (!) 144 kg (318 lb 5 5 oz)     Additional Vital Signs:   Date/Time Temp Pulse Resp BP MAP (mmHg) SpO2 O2 Device O2 Interface Device Patient Position - Orthostatic VS   06/06/23 15:49:10 98 3 °F (36 8 °C) 87 16 153/81 105 92 % -- -- --   06/06/23 07:20:40 98 5 °F (36 9 °C) 84 14 151/78 102 92 % -- -- --   06/05/23 22:03:58 98 °F (36 7 °C) 91 18 152/70 97 94 % -- Face mask --   06/05/23 1432 -- -- -- 188/102 Abnormal  -- -- -- -- Lying   06/05/23 1425 -- -- -- 190/102 Abnormal  -- -- -- -- Lying   06/05/23 14:23:40 97 8 °F (36 6 °C) 94 24 Abnormal  208/105 Abnormal  139 97 % -- -- Lying   06/05/23 1340 -- 93 19 154/88 113 99 % None (Room air) -- Lying   06/05/23 1336 -- -- -- 154/88 -- -- -- -- --   06/05/23 1115 -- 88 20 163/91 119 98 % None (Room air) -- Lying   06/05/23 0841 98 1 °F (36 7 °C) 80 21 206/124 Abnormal  156 98 % None (Room air) -- Lying     Pertinent Labs/Diagnostic Test Results:   CT abdomen pelvis w contrast   Final Result by Scarlett Garcia MD (06/06 0911)      1  Gallbladder luminal distention with mild pericholecystic inflammation  No calcified gallstones  Patient's normal alkaline phosphatase and bilirubin, and leukocytosis is noted  Findings are equivocal for acute cholecystitis  2   Trace ascites in the right upper quadrant and pelvis is of uncertain etiology  3   No bowel obstruction  4   Normal appearance of the pancreas  US right upper quadrant  6/6: Cholelithiasis  No gallbladder wall thickening  Sonographic Harrel Mad sign is negative  A small amount of pericholecystic fluid is seen, clinical significance is uncertain given presence of small volume ascites, as seen on 6/5/2023  Findings are equivocal   for acute cholecystitis  Clinical correlation is recommended   HIDA scan can be performed for further evaluation as clinically warranted     No intrahepatic or extrahepatic biliary ductal dilation       Suboptimal visualization of the liver and pancreas, limiting evaluation         Results from last 7 days   Lab Units 06/06/23  1434 06/06/23  0552 06/05/23  0838   HEMATOCRIT % 45 3 42 4 43 0   HEMOGLOBIN g/dL 14 9 14 3 14 3   NEUTROS ABS Thousands/µL 16 39* 15 86* 8 64*   PLATELETS Thousands/uL 223 224 206   WBC Thousand/uL 19 85* 19 23* 10 34*     Results from last 7 days   Lab Units 06/06/23  0552 06/05/23  0838   ANION GAP mmol/L 2* 2*   BUN mg/dL 12 12   CALCIUM mg/dL 8 8 8 9   CHLORIDE mmol/L 101 107   CO2 mmol/L 29 24   CREATININE mg/dL 0 87 1 04   EGFR ml/min/1 73sq m 96 79   POTASSIUM mmol/L 3 8 4 3   SODIUM mmol/L 132* 133*     Results from last 7 days   Lab Units 06/06/23  0552 06/05/23  0838   ALBUMIN g/dL 3 2*  3 1* 3 3*   ALK PHOS U/L 69  68 69   ALT U/L 73  72 27   AST U/L 51*  53* 22   BILIRUBIN DIRECT mg/dL 0 24*  --    TOTAL BILIRUBIN mg/dL 0 77  0 76 0 34   TOTAL PROTEIN g/dL 8 2  8 1 8 3     Results from last 7 days   Lab Units 06/06/23  1203 06/06/23  0614 06/06/23  0154 06/05/23  2346 06/05/23  1551 06/05/23  0840   POC GLUCOSE mg/dl 170* 185* 175* 175* 191* 217*     Results from last 7 days   Lab Units 06/06/23  0552 06/05/23  0838   GLUCOSE RANDOM mg/dL 173* 191*     Results from last 7 days   Lab Units 06/05/23  0838   AMYLASE IU/L 120*   LIPASE u/L 450*         ED Treatment:   Medication Administration from 06/05/2023 0815 to 06/05/2023 1403       Date/Time Order Dose Route Action     06/05/2023 0837 EDT multi-electrolyte (ISOLYTE-S PH 7 4) bolus 500 mL 500 mL Intravenous New Bag     06/05/2023 0836 EDT ondansetron (ZOFRAN) injection 4 mg 4 mg Intravenous Given     06/05/2023 0836 EDT HYDROmorphone (DILAUDID) injection 0 5 mg 0 5 mg Intravenous Given     06/05/2023 0932 EDT multi-electrolyte (ISOLYTE-S PH 7 4) bolus 500 mL 500 mL Intravenous New Bag     06/05/2023 1336 EDT amLODIPine (NORVASC) tablet 5 mg 5 mg Oral Given     06/05/2023 1337 EDT lisinopril (ZESTRIL) tablet 20 mg 20 mg Oral Given     06/05/2023 1337 EDT aspirin (ECOTRIN LOW STRENGTH) EC tablet 81 mg 81 mg Oral Given     06/05/2023 1337 EDT atorvastatin (LIPITOR) tablet 40 mg 40 mg Oral Given     06/05/2023 1337 EDT clopidogrel (PLAVIX) tablet 75 mg 75 mg Oral Given     06/05/2023 1337 EDT pregabalin (LYRICA) capsule 75 mg 75 mg Oral Given     06/05/2023 1332 EDT acetaminophen (TYLENOL) tablet 650 mg 650 mg Oral Given     06/05/2023 1115 EDT aluminum-magnesium hydroxide-simethicone (MYLANTA) oral suspension 30 mL 30 mL Oral Given     06/05/2023 1337 EDT docusate sodium (COLACE) capsule 100 mg 100 mg Oral Given     06/05/2023 1337 EDT senna (SENOKOT) tablet 8 6 mg 8 6 mg Oral Given     06/05/2023 1331 EDT enoxaparin (LOVENOX) subcutaneous injection 40 mg 40 mg Subcutaneous Given     06/05/2023 1339 EDT multi-electrolyte (PLASMALYTE-A/ISOLYTE-S PH 7 4) IV solution 100 mL/hr Intravenous New Bag     06/05/2023 1337 EDT spironolactone (ALDACTONE) tablet 25 mg 25 mg Oral Given     06/05/2023 1331 EDT hydrochlorothiazide (HYDRODIURIL) tablet 25 mg 25 mg Oral Given     06/05/2023 1332 EDT oxyCODONE (ROXICODONE) immediate release tablet 10 mg 10 mg Oral Given     Past Medical History:   Diagnosis Date   • Diabetes mellitus ([facility] )    • Hypertension    • Stroke [facility])      Present on Admission:  • Obstructive sleep apnea syndrome  • Hyperlipidemia  • CVA (cerebral vascular accident) ([facility] )  • PAD (peripheral artery disease) ([facility])  • DM (diabetes mellitus), type 2 ([facility])      Admitting Diagnosis: Acute pancreatitis [K85 90]  Abdominal pain [R10 9]  Nausea and vomiting [R11 2]  N&V (nausea and vomiting) [R11 2]  Age/Sex: 66 y o  male  Admission Orders:  Scheduled Medications:  acetaminophen, 975 mg, Oral, Q8H [address]  amLODIPine, 5 mg, Oral, Daily   And  lisinopril, 20 mg, Oral, Daily  ammonium lactate, , Topical, Daily  aspirin, 81 mg, Oral, Daily  atorvastatin, 40 mg, Oral, Daily  cefazolin, 2,000 mg, Intravenous, Q8H  clopidogrel, 75 mg, Oral, Daily  docusate sodium, 100 mg, Oral, BID  enoxaparin, 40 mg, Subcutaneous, Daily  spironolactone, 25 mg, Oral, Daily   And  hydrochlorothiazide, 25 mg, Oral, Daily  insulin lispro, 1-6 Units, Subcutaneous, 0200  insulin lispro, 2-12 Units, Subcutaneous, Q6H ACACIA  metroNIDAZOLE, 500 mg, Oral, Q8H [address]  nicotine, 1 patch, Transdermal, Daily  pregabalin, 75 mg, Oral, BID  senna, 1 tablet, Oral, Daily    Continuous IV Infusions:  multi-electrolyte, 100 mL/hr, Intravenous, Continuous    PRN Meds:  aluminum-magnesium hydroxide-simethicone, 30 mL, Oral, Q6H PRN 6/5 x1  HYDROmorphone, 0 5 mg, Intravenous, Q4H PRN 6/5 x2  metoprolol, 2 5 mg, Intravenous, Q6H PRN 6/5 x1  ondansetron, 4 mg, Intravenous, Q6H PRN 6/5 x1  oxyCODONE, 10 mg, Oral, Q4H PRN 6/5 x3, 6/6 x4  oxyCODONE, 5 mg, Oral, Q4H PRN          Network Utilization Review Department  ATTENTION: Please call with any questions or concerns to [phone] and carefully listen to the prompts so that you are directed to the right person  All voicemails are confidential   Meño Goss all requests for admission clinical reviews, approved or denied determinations and any other requests to dedicated fax number below belonging to the campus where the patient is receiving treatment   List of dedicated fax numbers for the Facilities:  [address] DENIALS (Administrative/Medical Necessity) [phone]   [address] (Maternity/NICU/Pediatrics) [phone]   3 Shalonda Mendez [phone]   [facility] 77 [phone]   [id number] [place] [address] 28 [phone]   [address] [facility] 815 [facility] [phone]

## 2023-06-06 NOTE — PLAN OF CARE
Problem: Potential for Falls  Goal: Patient will remain free of falls  Description: INTERVENTIONS:  - Educate patient/family on patient safety including physical limitations  - Instruct patient to call for assistance with activity   - Consult OT/PT to assist with strengthening/mobility   - Keep Call bell within reach  - Keep bed low and locked with side rails adjusted as appropriate  - Keep care items and personal belongings within reach  - Initiate and maintain comfort rounds  - Make Fall Risk Sign visible to staff  - Offer Toileting every 2 Hours, in advance of need  - Initiate/Maintain TABS  - Obtain necessary fall risk management equipment: non-skid socks  - Apply yellow socks and bracelet for high fall risk patients  - Consider moving patient to room near nurses station  Outcome: Progressing     Problem: Prexisting or High Potential for Compromised Skin Integrity  Goal: Skin integrity is maintained or improved  Description: INTERVENTIONS:  - Identify patients at risk for skin breakdown  - Assess and monitor skin integrity  - Assess and monitor nutrition and hydration status  - Monitor labs   - Assess for incontinence   - Turn and reposition patient  - Assist with mobility/ambulation  - Relieve pressure over bony prominences  - Avoid friction and shearing  - Provide appropriate hygiene as needed including keeping skin clean and dry  - Evaluate need for skin moisturizer/barrier cream  - Collaborate with interdisciplinary team   - Patient/family teaching  - Consider wound care consult   Outcome: Progressing

## 2023-06-07 ENCOUNTER — ANESTHESIA EVENT (INPATIENT)
Dept: PERIOP | Facility: HOSPITAL | Age: 58
DRG: 417 | End: 2023-06-07
Payer: COMMERCIAL

## 2023-06-07 ENCOUNTER — ANESTHESIA (INPATIENT)
Dept: PERIOP | Facility: HOSPITAL | Age: 58
DRG: 417 | End: 2023-06-07
Payer: COMMERCIAL

## 2023-06-07 ENCOUNTER — APPOINTMENT (INPATIENT)
Dept: RADIOLOGY | Facility: HOSPITAL | Age: 58
DRG: 417 | End: 2023-06-07
Payer: COMMERCIAL

## 2023-06-07 LAB
ALBUMIN SERPL BCP-MCNC: 2.8 G/DL (ref 3.5–5)
ALP SERPL-CCNC: 79 U/L (ref 46–116)
ALT SERPL W P-5'-P-CCNC: 103 U/L (ref 12–78)
ANION GAP SERPL CALCULATED.3IONS-SCNC: 2 MMOL/L (ref 4–13)
AST SERPL W P-5'-P-CCNC: 60 U/L (ref 5–45)
BILIRUB DIRECT SERPL-MCNC: 0.34 MG/DL (ref 0–0.2)
BILIRUB SERPL-MCNC: 0.81 MG/DL (ref 0.2–1)
BUN SERPL-MCNC: 13 MG/DL (ref 5–25)
CALCIUM SERPL-MCNC: 8.5 MG/DL (ref 8.3–10.1)
CHLORIDE SERPL-SCNC: 100 MMOL/L (ref 96–108)
CO2 SERPL-SCNC: 30 MMOL/L (ref 21–32)
CREAT SERPL-MCNC: 1 MG/DL (ref 0.6–1.3)
GFR SERPL CREATININE-BSD FRML MDRD: 83 ML/MIN/1.73SQ M
GLUCOSE SERPL-MCNC: 139 MG/DL (ref 65–140)
GLUCOSE SERPL-MCNC: 154 MG/DL (ref 65–140)
GLUCOSE SERPL-MCNC: 166 MG/DL (ref 65–140)
GLUCOSE SERPL-MCNC: 173 MG/DL (ref 65–140)
GLUCOSE SERPL-MCNC: 176 MG/DL (ref 65–140)
GLUCOSE SERPL-MCNC: 183 MG/DL (ref 65–140)
MAGNESIUM SERPL-MCNC: 2.5 MG/DL (ref 1.6–2.6)
POTASSIUM SERPL-SCNC: 3.8 MMOL/L (ref 3.5–5.3)
PROT SERPL-MCNC: 7.9 G/DL (ref 6.4–8.4)
SODIUM SERPL-SCNC: 132 MMOL/L (ref 135–147)

## 2023-06-07 PROCEDURE — 88304 TISSUE EXAM BY PATHOLOGIST: CPT | Performed by: PATHOLOGY

## 2023-06-07 PROCEDURE — 83735 ASSAY OF MAGNESIUM: CPT | Performed by: PHYSICIAN ASSISTANT

## 2023-06-07 PROCEDURE — 94660 CPAP INITIATION&MGMT: CPT

## 2023-06-07 PROCEDURE — 80048 BASIC METABOLIC PNL TOTAL CA: CPT | Performed by: PHYSICIAN ASSISTANT

## 2023-06-07 PROCEDURE — 80076 HEPATIC FUNCTION PANEL: CPT | Performed by: PHYSICIAN ASSISTANT

## 2023-06-07 PROCEDURE — 99232 SBSQ HOSP IP/OBS MODERATE 35: CPT | Performed by: INTERNAL MEDICINE

## 2023-06-07 PROCEDURE — 82948 REAGENT STRIP/BLOOD GLUCOSE: CPT

## 2023-06-07 PROCEDURE — 0FT44ZZ RESECTION OF GALLBLADDER, PERCUTANEOUS ENDOSCOPIC APPROACH: ICD-10-PCS | Performed by: SURGERY

## 2023-06-07 PROCEDURE — 47562 LAPAROSCOPIC CHOLECYSTECTOMY: CPT | Performed by: SURGERY

## 2023-06-07 RX ORDER — SODIUM CHLORIDE, SODIUM LACTATE, POTASSIUM CHLORIDE, CALCIUM CHLORIDE 600; 310; 30; 20 MG/100ML; MG/100ML; MG/100ML; MG/100ML
INJECTION, SOLUTION INTRAVENOUS CONTINUOUS PRN
Status: DISCONTINUED | OUTPATIENT
Start: 2023-06-07 | End: 2023-06-07

## 2023-06-07 RX ORDER — LIDOCAINE HYDROCHLORIDE 10 MG/ML
INJECTION, SOLUTION EPIDURAL; INFILTRATION; INTRACAUDAL; PERINEURAL AS NEEDED
Status: DISCONTINUED | OUTPATIENT
Start: 2023-06-07 | End: 2023-06-07

## 2023-06-07 RX ORDER — PROPOFOL 10 MG/ML
INJECTION, EMULSION INTRAVENOUS AS NEEDED
Status: DISCONTINUED | OUTPATIENT
Start: 2023-06-07 | End: 2023-06-07

## 2023-06-07 RX ORDER — MIDAZOLAM HYDROCHLORIDE 2 MG/2ML
INJECTION, SOLUTION INTRAMUSCULAR; INTRAVENOUS AS NEEDED
Status: DISCONTINUED | OUTPATIENT
Start: 2023-06-07 | End: 2023-06-07

## 2023-06-07 RX ORDER — DEXAMETHASONE SODIUM PHOSPHATE 10 MG/ML
INJECTION, SOLUTION INTRAMUSCULAR; INTRAVENOUS AS NEEDED
Status: DISCONTINUED | OUTPATIENT
Start: 2023-06-07 | End: 2023-06-07

## 2023-06-07 RX ORDER — DIPHENHYDRAMINE HYDROCHLORIDE 50 MG/ML
12.5 INJECTION INTRAMUSCULAR; INTRAVENOUS ONCE AS NEEDED
Status: DISCONTINUED | OUTPATIENT
Start: 2023-06-07 | End: 2023-06-07 | Stop reason: HOSPADM

## 2023-06-07 RX ORDER — BUPIVACAINE HYDROCHLORIDE 5 MG/ML
INJECTION, SOLUTION PERINEURAL AS NEEDED
Status: DISCONTINUED | OUTPATIENT
Start: 2023-06-07 | End: 2023-06-07 | Stop reason: HOSPADM

## 2023-06-07 RX ORDER — PROMETHAZINE HYDROCHLORIDE 25 MG/ML
25 INJECTION, SOLUTION INTRAMUSCULAR; INTRAVENOUS ONCE AS NEEDED
Status: DISCONTINUED | OUTPATIENT
Start: 2023-06-07 | End: 2023-06-07 | Stop reason: HOSPADM

## 2023-06-07 RX ORDER — SODIUM CHLORIDE, SODIUM LACTATE, POTASSIUM CHLORIDE, CALCIUM CHLORIDE 600; 310; 30; 20 MG/100ML; MG/100ML; MG/100ML; MG/100ML
125 INJECTION, SOLUTION INTRAVENOUS CONTINUOUS
Status: DISCONTINUED | OUTPATIENT
Start: 2023-06-07 | End: 2023-06-09

## 2023-06-07 RX ORDER — FENTANYL CITRATE/PF 50 MCG/ML
50 SYRINGE (ML) INJECTION
Status: DISCONTINUED | OUTPATIENT
Start: 2023-06-07 | End: 2023-06-07 | Stop reason: HOSPADM

## 2023-06-07 RX ORDER — HYDROMORPHONE HCL/PF 1 MG/ML
0.5 SYRINGE (ML) INJECTION
Status: DISCONTINUED | OUTPATIENT
Start: 2023-06-07 | End: 2023-06-07 | Stop reason: HOSPADM

## 2023-06-07 RX ORDER — MAGNESIUM HYDROXIDE 1200 MG/15ML
LIQUID ORAL AS NEEDED
Status: DISCONTINUED | OUTPATIENT
Start: 2023-06-07 | End: 2023-06-07 | Stop reason: HOSPADM

## 2023-06-07 RX ORDER — ROCURONIUM BROMIDE 10 MG/ML
INJECTION, SOLUTION INTRAVENOUS AS NEEDED
Status: DISCONTINUED | OUTPATIENT
Start: 2023-06-07 | End: 2023-06-07

## 2023-06-07 RX ORDER — ONDANSETRON 2 MG/ML
INJECTION INTRAMUSCULAR; INTRAVENOUS AS NEEDED
Status: DISCONTINUED | OUTPATIENT
Start: 2023-06-07 | End: 2023-06-07

## 2023-06-07 RX ORDER — FENTANYL CITRATE 50 UG/ML
INJECTION, SOLUTION INTRAMUSCULAR; INTRAVENOUS AS NEEDED
Status: DISCONTINUED | OUTPATIENT
Start: 2023-06-07 | End: 2023-06-07

## 2023-06-07 RX ORDER — METRONIDAZOLE 500 MG/1
500 TABLET ORAL EVERY 8 HOURS SCHEDULED
Status: CANCELLED | OUTPATIENT
Start: 2023-06-08 | End: 2023-06-12

## 2023-06-07 RX ORDER — FENTANYL CITRATE/PF 50 MCG/ML
25 SYRINGE (ML) INJECTION
Status: DISCONTINUED | OUTPATIENT
Start: 2023-06-07 | End: 2023-06-07 | Stop reason: HOSPADM

## 2023-06-07 RX ORDER — ONDANSETRON 2 MG/ML
4 INJECTION INTRAMUSCULAR; INTRAVENOUS ONCE AS NEEDED
Status: DISCONTINUED | OUTPATIENT
Start: 2023-06-07 | End: 2023-06-07 | Stop reason: HOSPADM

## 2023-06-07 RX ORDER — CEFAZOLIN SODIUM 2 G/50ML
2000 SOLUTION INTRAVENOUS EVERY 8 HOURS
Status: CANCELLED | OUTPATIENT
Start: 2023-06-08 | End: 2023-06-12

## 2023-06-07 RX ADMIN — ACETAMINOPHEN 975 MG: 325 TABLET, FILM COATED ORAL at 13:09

## 2023-06-07 RX ADMIN — SODIUM CHLORIDE, SODIUM GLUCONATE, SODIUM ACETATE, POTASSIUM CHLORIDE, MAGNESIUM CHLORIDE, SODIUM PHOSPHATE, DIBASIC, AND POTASSIUM PHOSPHATE 100 ML/HR: .53; .5; .37; .037; .03; .012; .00082 INJECTION, SOLUTION INTRAVENOUS at 23:25

## 2023-06-07 RX ADMIN — SPIRONOLACTONE 25 MG: 25 TABLET ORAL at 08:49

## 2023-06-07 RX ADMIN — LISINOPRIL 20 MG: 20 TABLET ORAL at 08:49

## 2023-06-07 RX ADMIN — CEFAZOLIN SODIUM 2000 MG: 2 SOLUTION INTRAVENOUS at 04:03

## 2023-06-07 RX ADMIN — FENTANYL CITRATE 50 MCG: 50 INJECTION, SOLUTION INTRAMUSCULAR; INTRAVENOUS at 21:03

## 2023-06-07 RX ADMIN — DOCUSATE SODIUM 100 MG: 100 CAPSULE, LIQUID FILLED ORAL at 17:36

## 2023-06-07 RX ADMIN — OXYCODONE HYDROCHLORIDE 10 MG: 10 TABLET ORAL at 17:36

## 2023-06-07 RX ADMIN — AMLODIPINE BESYLATE 5 MG: 5 TABLET ORAL at 08:49

## 2023-06-07 RX ADMIN — PIPERACILLIN SODIUM AND TAZOBACTAM SODIUM 3.38 G: 36; 4.5 INJECTION, POWDER, LYOPHILIZED, FOR SOLUTION INTRAVENOUS at 23:59

## 2023-06-07 RX ADMIN — METRONIDAZOLE 500 MG: 500 TABLET ORAL at 05:51

## 2023-06-07 RX ADMIN — SODIUM CHLORIDE, SODIUM GLUCONATE, SODIUM ACETATE, POTASSIUM CHLORIDE, MAGNESIUM CHLORIDE, SODIUM PHOSPHATE, DIBASIC, AND POTASSIUM PHOSPHATE 100 ML/HR: .53; .5; .37; .037; .03; .012; .00082 INJECTION, SOLUTION INTRAVENOUS at 09:39

## 2023-06-07 RX ADMIN — PROPOFOL 50 MG: 10 INJECTION, EMULSION INTRAVENOUS at 22:56

## 2023-06-07 RX ADMIN — ACETAMINOPHEN 975 MG: 325 TABLET, FILM COATED ORAL at 05:51

## 2023-06-07 RX ADMIN — CEFAZOLIN SODIUM 2000 MG: 2 SOLUTION INTRAVENOUS at 11:51

## 2023-06-07 RX ADMIN — ROCURONIUM BROMIDE 50 MG: 10 INJECTION, SOLUTION INTRAVENOUS at 21:03

## 2023-06-07 RX ADMIN — SODIUM CHLORIDE, SODIUM LACTATE, POTASSIUM CHLORIDE, AND CALCIUM CHLORIDE: .6; .31; .03; .02 INJECTION, SOLUTION INTRAVENOUS at 20:54

## 2023-06-07 RX ADMIN — ONDANSETRON 4 MG: 2 INJECTION INTRAMUSCULAR; INTRAVENOUS at 22:30

## 2023-06-07 RX ADMIN — MIDAZOLAM 2 MG: 1 INJECTION INTRAMUSCULAR; INTRAVENOUS at 20:52

## 2023-06-07 RX ADMIN — HYDROCHLOROTHIAZIDE 25 MG: 25 TABLET ORAL at 08:49

## 2023-06-07 RX ADMIN — PROPOFOL 200 MG: 10 INJECTION, EMULSION INTRAVENOUS at 21:03

## 2023-06-07 RX ADMIN — DEXAMETHASONE SODIUM PHOSPHATE 10 MG: 10 INJECTION, SOLUTION INTRAMUSCULAR; INTRAVENOUS at 21:07

## 2023-06-07 RX ADMIN — PREGABALIN 75 MG: 75 CAPSULE ORAL at 17:36

## 2023-06-07 RX ADMIN — SODIUM CHLORIDE, SODIUM LACTATE, POTASSIUM CHLORIDE, AND CALCIUM CHLORIDE: .6; .31; .03; .02 INJECTION, SOLUTION INTRAVENOUS at 22:01

## 2023-06-07 RX ADMIN — LIDOCAINE HYDROCHLORIDE 50 MG: 10 INJECTION, SOLUTION EPIDURAL; INFILTRATION; INTRACAUDAL; PERINEURAL at 21:03

## 2023-06-07 RX ADMIN — OXYCODONE HYDROCHLORIDE 10 MG: 10 TABLET ORAL at 08:50

## 2023-06-07 RX ADMIN — PREGABALIN 75 MG: 75 CAPSULE ORAL at 08:50

## 2023-06-07 RX ADMIN — SUGAMMADEX 300 MG: 100 INJECTION, SOLUTION INTRAVENOUS at 22:35

## 2023-06-07 RX ADMIN — Medication 3000 MG: at 21:15

## 2023-06-07 RX ADMIN — INSULIN LISPRO 1 UNITS: 100 INJECTION, SOLUTION INTRAVENOUS; SUBCUTANEOUS at 02:06

## 2023-06-07 RX ADMIN — OXYCODONE HYDROCHLORIDE 10 MG: 10 TABLET ORAL at 13:09

## 2023-06-07 RX ADMIN — PHENYLEPHRINE HYDROCHLORIDE 30 MCG/MIN: 10 INJECTION INTRAVENOUS at 21:09

## 2023-06-07 RX ADMIN — FENTANYL CITRATE 50 MCG: 50 INJECTION, SOLUTION INTRAMUSCULAR; INTRAVENOUS at 22:49

## 2023-06-07 RX ADMIN — METRONIDAZOLE 500 MG: 500 TABLET ORAL at 13:09

## 2023-06-07 NOTE — PROGRESS NOTES
"Progress Note -General surgery  Maritza Kwan 66 y o  male MRN: [id number]  [facility] Encounter: [id number]    Assessment:  66 y o  male with symptomatic cholelithiasis  Vitals remain within normal limits, however he has a rising leukocytosis of 19 8  His abdomen continues to be soft and tender  Bilirubin this morning remains within normal limits  Plan:  - Diet NPO; Sips with meds  - Pain and Nausea control PRN  - Incentive spirometry  - OOB, ambulate  - Plan for OR today for laparoscopic cholecystectomy  -Potentially discharge today versus tomorrow depending on timing of procedure and patient's status postoperatively    Subjective/Objective     Subjective: Patient reports that his RUQ abdominal pain is unchanged since admission  Denies any nausea or emesis  Objective:   Vitals: Blood pressure 149/79, pulse 89, temperature 98 4 °F (36 9 °C), resp  rate 16, height 6' 2\" (1 88 m), weight (!) 144 kg (318 lb 5 5 oz), SpO2 92 %  ,Body mass index is 40 87 kg/m²  I/O       06/05 0701 06/06 0700 06/06 0701 06/07 0700    P  O   0    I V  (mL/kg) 900 (6 3)     Total Intake(mL/kg) 900 (6 3) 0 (0)    Urine (mL/kg/hr) 430 1050 (0 5)    Total Output 430 1050    Net +470 -1050                Physical Exam:  Gen: NAD, Aox3, Comfortable in bed  Chest: Normal work of breathing, no respiratory distress  Abd: Soft, ND, RUQ tender  No rebound no guarding  Ext: No Edema  Skin: Warm, Dry, Intact      Lab, Imaging and other studies:   I have personally reviewed pertinent reports    , CBC with diff:   Lab Results   Component Value Date    HCT 45 3 06/06/2023    HGB 14 9 06/06/2023    MCH 28 3 06/06/2023    MCHC 32 9 06/06/2023    MCV 86 06/06/2023    MPV 11 0 06/06/2023    NRBC 0 06/06/2023     06/06/2023    RBC 5 26 06/06/2023    RDW 13 2 06/06/2023    WBC 19 85 (H) 06/06/2023   , BMP/CMP:   Lab Results   Component Value Date    ALKPHOS 79 06/07/2023     (H) 06/07/2023    AST 60 (H) 06/07/2023 " "   BUN 13 06/07/2023    CALCIUM 8 5 06/07/2023     06/07/2023    CO2 30 06/07/2023    CREATININE 1 00 06/07/2023    EGFR 83 06/07/2023    K 3 8 06/07/2023    SODIUM 132 (L) 06/07/2023   , Magnesium: No components found for: \"MAG\"  VTE Pharmacologic Prophylaxis: Heparin  VTE Mechanical Prophylaxis: sequential compression device        Alejandra Zavala MD  6/6/2023  11:04 PM    "

## 2023-06-07 NOTE — PROGRESS NOTES
[facility]  Progress Note  Name: Stephen Gordon  MRN: [id number]  Unit/Bed#: [facility] 731-67 I Date of Admission: 6/5/2023   Date of Service: 6/7/2023 I Hospital Day: 2    Assessment/Plan   * Epigastric pain  Assessment & Plan  · Patient presented with epigastric pain, radiating to back and lower abdomen associated with vomiting  · Likely 2/2 acute cholecystitis  · CT  Abdomen pelvis shows Gallbladder luminal distention with mild pericholecystic inflammation  No calcified gallstones  No biliary ductal dilation  Pancreas normal   · Lipase and amylase slightly elevated above normal range  · Lipid profile with normal TG level  · RUQ USG noted  · Keep n p o  for now  · Continue IV fluids  · Antibiotics per primary team   · Plan for OR for lap hemant per primary team today  · Conservative management with Zofran, Mylanta and pain control  Primary hypertension  Assessment & Plan  · Patient is on Aldactazide 25/25 and Lotrel  5-20 at home  · Elevated BP due to pain, better as compared to before  · continue home anti hypertensive regimen  · Adequate pain control      DM (diabetes mellitus), type 2 [facility])  Assessment & Plan  Lab Results   Component Value Date    HGBA1C 7 0 (H) 04/06/2023       Recent Labs     06/06/23  2257 06/07/23  0203 06/07/23  0547 06/07/23  1224   POCGLU 174* 173* 139 183*       Blood Sugar Average: Last 72 hrs:  (P) 173 [id number]     · Patient is on metformin, glipizide and Januvia at home  · sliding scale insulin while inpatient  Can also start on Lantus once diet resumed  · Continue hypoglycemia protocol  · Continue Lyrica at home dosage  PAD (peripheral artery disease) ([facility] [address] )  Assessment & Plan  · Patient is on aspirin, Plavix and statin at home that he has stopped taking as he ran out of prescription    · Continue aspirin, Plavix and statin while in hospital     CVA (cerebral vascular accident) [facility])  Assessment & Plan  History noted, patient has stopped taking aspirin and statin at home  Resumed while in hospital, encouraged on compliance  Hyperlipidemia  Assessment & Plan  · Patient is on atorvastatin at home that he has stopped taking, will resume while inpatient  · He was encouraged on being compliant with his medications  Morbid obesity with BMI of 40 0-44 9, adult ([address] )  Assessment & Plan  · Patient's BMI 40  87  · Encouraged on lifestyle modification including diet changes and regular exercise, per patient he tries to exercise every day but have been noncompliant with diet changes  Obstructive sleep apnea syndrome  Assessment & Plan  CPAP at nighttime  [address] Internal Medicine Consultation Follow Up Progress Note  Patient: Allegra lCarke 66 y o  male   MRN: [id number]  PCP: RENNY Ireland  Unit/Bed#: [facility] 588-76 Encounter: [id number]  Date Of Visit: 23          Time Spent for Care: 30 minutes  More than 50% of total time spent on counseling and coordination of care as described above  Subjective:   No overnight events  Still with RUQ pain  Objective:     Vitals:   Temp (24hrs), Av 6 °F (37 °C), Min:98 3 °F (36 8 °C), Max:99 °F (37 2 °C)    Temp:  [98 3 °F (36 8 °C)-99 °F (37 2 °C)] 99 °F (37 2 °C)  HR:  [89] 89  Resp:  [16-20] 20  BP: (149-159)/(79-87) 152/87  SpO2:  [91 %-95 %] 91 %  Body mass index is 40 87 kg/m²  Input and Output Summary (last 24 hours): Intake/Output Summary (Last 24 hours) at 2023 1557  Last data filed at 2023 8394  Gross per 24 hour   Intake 2693 33 ml   Output 945 ml   Net 1748 33 ml       Physical Exam:     Physical Exam  Constitutional:       General: He is not in acute distress  Cardiovascular:      Rate and Rhythm: Normal rate and regular rhythm  Heart sounds: Normal heart sounds  No murmur heard  Pulmonary:      Effort: No respiratory distress  Breath sounds: Normal breath sounds  No wheezing or rales     Abdominal: General: Bowel sounds are normal  There is no distension  Palpations: Abdomen is soft  Tenderness: There is abdominal tenderness  Skin:     General: Skin is warm  Neurological:      Mental Status: He is alert  Comments: Awake alert and communicative  Baseline left sided weakness noted     Additional Data:     Results from last 7 days   Lab Units 06/06/23  1434   EOS PCT % 0   HEMATOCRIT % 45 3   HEMOGLOBIN g/dL 14 9   LYMPHS PCT % 8*   MONOS PCT % 9   NEUTROS PCT % 82*   PLATELETS Thousands/uL 223   WBC Thousand/uL 19 85*     Results from last 7 days   Lab Units 06/07/23  0538   ALK PHOS U/L 79   ALT U/L 103*   AST U/L 60*   BUN mg/dL 13   CALCIUM mg/dL 8 5   CHLORIDE mmol/L 100   CO2 mmol/L 30   CREATININE mg/dL 1 00   POTASSIUM mmol/L 3 8           * I Have Reviewed All Lab Data Listed Above  * Additional Pertinent Lab Tests Reviewed:  All Labs Within Last 24 Hours Reviewed      Last 24 Hours Medication List:   Current Facility-Administered Medications   Medication Dose Route Frequency Provider Last Rate   • acetaminophen  975 mg Oral [facility] Tone Fowler PA-C     • aluminum-magnesium hydroxide-simethicone  30 mL Oral Q6H PRN Matthew Jim MD     • amLODIPine  5 mg Oral Daily Matthew Jim MD      And   • lisinopril  20 mg Oral Daily Matthew Jim MD     • ammonium lactate   Topical Daily Matthew Jim MD     • aspirin  81 mg Oral Daily Matthew Jim MD     • atorvastatin  40 mg Oral Daily Matthew Jim MD     • cefazolin  2,000 mg Intravenous Q8H Tone Fowler PA-C 2,000 mg (06/07/23 1151)   • clopidogrel  75 mg Oral Daily Matthew Jim MD     • docusate sodium  100 mg Oral BID Matthew Jim MD     • enoxaparin  40 mg Subcutaneous Daily Matthew Jim MD     • spironolactone  25 mg Oral Daily Matthew Jim MD      And   • hydrochlorothiazide  25 mg Oral Daily Matthew Jim MD     • HYDROmorphone  0 5 mg Intravenous Q4H PRN Matthew Jim MD     • insulin lispro  1-6 Units Subcutaneous 0200 Matthew Jim MD     • insulin lispro  2-12 Units Subcutaneous Q6H Buckner Dandy, MD     • metoprolol  2 5 mg Intravenous Q6H PRN Deloris Hayward MD     • metroNIDAZOLE  500 mg Oral [facility] Jacinta Bee PA-C     • multi-electrolyte  100 mL/hr Intravenous Continuous Morro Day PA-C 100 mL/hr (06/07/23 9834)   • nicotine  1 patch Transdermal Daily Deloris Hayward MD     • ondansetron  4 mg Intravenous Q6H PRN Deloris Hayward MD     • oxyCODONE  10 mg Oral Q4H PRN Deloris Hayward MD     • oxyCODONE  5 mg Oral Q4H PRN Deloris Hayward MD     • pregabalin  75 mg Oral BID Deloris Hayward MD     • senna  1 tablet Oral Daily Deloris Hayward MD          Today, Patient Was Seen By: Geovanna Park MD    ** Please Note: This note has been constructed using a voice recognition system   **

## 2023-06-07 NOTE — ASSESSMENT & PLAN NOTE
· Patient is on Aldactazide 25/25 and Lotrel  5-20 at home    · Elevated BP due to pain, better as compared to before  · continue home anti hypertensive regimen  · Adequate pain control

## 2023-06-07 NOTE — ASSESSMENT & PLAN NOTE
· Patient presented with epigastric pain, radiating to back and lower abdomen associated with vomiting  · Likely 2/2 acute cholecystitis  · CT  Abdomen pelvis shows Gallbladder luminal distention with mild pericholecystic inflammation  No calcified gallstones  No biliary ductal dilation  Pancreas normal   · Lipase and amylase slightly elevated above normal range  · Lipid profile with normal TG level  · RUQ USG noted  · Keep n p o  for now  · Continue IV fluids  · Antibiotics per primary team   · Plan for OR for lap hemant per primary team today  · Conservative management with Zofran, Mylanta and pain control

## 2023-06-07 NOTE — ASSESSMENT & PLAN NOTE
Lab Results   Component Value Date    HGBA1C 7 0 (H) 04/06/2023       Recent Labs     06/06/23  2257 06/07/23  0203 06/07/23  0547 06/07/23  1224   POCGLU 174* 173* 139 183*       Blood Sugar Average: Last 72 hrs:  (P) 173 [id number]     · Patient is on metformin, glipizide and Januvia at home  · sliding scale insulin while inpatient  Can also start on Lantus once diet resumed  · Continue hypoglycemia protocol  · Continue Lyrica at home dosage

## 2023-06-08 ENCOUNTER — APPOINTMENT (OUTPATIENT)
Dept: RADIOLOGY | Facility: HOSPITAL | Age: 58
DRG: 417 | End: 2023-06-08
Payer: COMMERCIAL

## 2023-06-08 LAB
ALBUMIN SERPL BCP-MCNC: 2.3 G/DL (ref 3.5–5)
ALP SERPL-CCNC: 75 U/L (ref 46–116)
ALT SERPL W P-5'-P-CCNC: 92 U/L (ref 12–78)
ANION GAP SERPL CALCULATED.3IONS-SCNC: 2 MMOL/L (ref 4–13)
AST SERPL W P-5'-P-CCNC: 66 U/L (ref 5–45)
BASOPHILS # BLD AUTO: 0.02 THOUSANDS/ÂΜL (ref 0–0.1)
BASOPHILS NFR BLD AUTO: 0 % (ref 0–1)
BILIRUB DIRECT SERPL-MCNC: 0.24 MG/DL (ref 0–0.2)
BILIRUB SERPL-MCNC: 0.49 MG/DL (ref 0.2–1)
BUN SERPL-MCNC: 16 MG/DL (ref 5–25)
CALCIUM SERPL-MCNC: 8.3 MG/DL (ref 8.3–10.1)
CHLORIDE SERPL-SCNC: 101 MMOL/L (ref 96–108)
CO2 SERPL-SCNC: 28 MMOL/L (ref 21–32)
CREAT SERPL-MCNC: 1.09 MG/DL (ref 0.6–1.3)
EOSINOPHIL # BLD AUTO: 0 THOUSAND/ÂΜL (ref 0–0.61)
EOSINOPHIL NFR BLD AUTO: 0 % (ref 0–6)
ERYTHROCYTE [DISTWIDTH] IN BLOOD BY AUTOMATED COUNT: 13.2 % (ref 11.6–15.1)
GFR SERPL CREATININE-BSD FRML MDRD: 75 ML/MIN/1.73SQ M
GLUCOSE SERPL-MCNC: 195 MG/DL (ref 65–140)
GLUCOSE SERPL-MCNC: 201 MG/DL (ref 65–140)
GLUCOSE SERPL-MCNC: 206 MG/DL (ref 65–140)
GLUCOSE SERPL-MCNC: 206 MG/DL (ref 65–140)
GLUCOSE SERPL-MCNC: 219 MG/DL (ref 65–140)
GLUCOSE SERPL-MCNC: 222 MG/DL (ref 65–140)
GLUCOSE SERPL-MCNC: 227 MG/DL (ref 65–140)
HCT VFR BLD AUTO: 41.8 % (ref 36.5–49.3)
HGB BLD-MCNC: 13.4 G/DL (ref 12–17)
IMM GRANULOCYTES # BLD AUTO: 0.11 THOUSAND/UL (ref 0–0.2)
IMM GRANULOCYTES NFR BLD AUTO: 1 % (ref 0–2)
LYMPHOCYTES # BLD AUTO: 0.67 THOUSANDS/ÂΜL (ref 0.6–4.47)
LYMPHOCYTES NFR BLD AUTO: 4 % (ref 14–44)
MCH RBC QN AUTO: 27.9 PG (ref 26.8–34.3)
MCHC RBC AUTO-ENTMCNC: 32.1 G/DL (ref 31.4–37.4)
MCV RBC AUTO: 87 FL (ref 82–98)
MONOCYTES # BLD AUTO: 0.68 THOUSAND/ÂΜL (ref 0.17–1.22)
MONOCYTES NFR BLD AUTO: 4 % (ref 4–12)
NEUTROPHILS # BLD AUTO: 16.52 THOUSANDS/ÂΜL (ref 1.85–7.62)
NEUTS SEG NFR BLD AUTO: 91 % (ref 43–75)
NRBC BLD AUTO-RTO: 0 /100 WBCS
PLATELET # BLD AUTO: 209 THOUSANDS/UL (ref 149–390)
PMV BLD AUTO: 12 FL (ref 8.9–12.7)
POTASSIUM SERPL-SCNC: 4 MMOL/L (ref 3.5–5.3)
PROT SERPL-MCNC: 7.3 G/DL (ref 6.4–8.4)
RBC # BLD AUTO: 4.8 MILLION/UL (ref 3.88–5.62)
SODIUM SERPL-SCNC: 131 MMOL/L (ref 135–147)
WBC # BLD AUTO: 18 THOUSAND/UL (ref 4.31–10.16)

## 2023-06-08 PROCEDURE — 85025 COMPLETE CBC W/AUTO DIFF WBC: CPT | Performed by: SURGERY

## 2023-06-08 PROCEDURE — 80048 BASIC METABOLIC PNL TOTAL CA: CPT | Performed by: SURGERY

## 2023-06-08 PROCEDURE — 74183 MRI ABD W/O CNTR FLWD CNTR: CPT

## 2023-06-08 PROCEDURE — 99024 POSTOP FOLLOW-UP VISIT: CPT | Performed by: SURGERY

## 2023-06-08 PROCEDURE — 82948 REAGENT STRIP/BLOOD GLUCOSE: CPT

## 2023-06-08 PROCEDURE — A9585 GADOBUTROL INJECTION: HCPCS

## 2023-06-08 PROCEDURE — 94760 N-INVAS EAR/PLS OXIMETRY 1: CPT

## 2023-06-08 PROCEDURE — 94660 CPAP INITIATION&MGMT: CPT

## 2023-06-08 PROCEDURE — 80076 HEPATIC FUNCTION PANEL: CPT | Performed by: STUDENT IN AN ORGANIZED HEALTH CARE EDUCATION/TRAINING PROGRAM

## 2023-06-08 PROCEDURE — 99232 SBSQ HOSP IP/OBS MODERATE 35: CPT | Performed by: INTERNAL MEDICINE

## 2023-06-08 RX ORDER — INSULIN LISPRO 100 [IU]/ML
2-12 INJECTION, SOLUTION INTRAVENOUS; SUBCUTANEOUS
Status: DISCONTINUED | OUTPATIENT
Start: 2023-06-08 | End: 2023-06-10 | Stop reason: HOSPADM

## 2023-06-08 RX ORDER — INSULIN LISPRO 100 [IU]/ML
1-5 INJECTION, SOLUTION INTRAVENOUS; SUBCUTANEOUS
Status: DISCONTINUED | OUTPATIENT
Start: 2023-06-08 | End: 2023-06-10 | Stop reason: HOSPADM

## 2023-06-08 RX ORDER — INSULIN GLARGINE 100 [IU]/ML
10 INJECTION, SOLUTION SUBCUTANEOUS EVERY MORNING
Status: DISCONTINUED | OUTPATIENT
Start: 2023-06-08 | End: 2023-06-10 | Stop reason: HOSPADM

## 2023-06-08 RX ADMIN — INSULIN LISPRO 4 UNITS: 100 INJECTION, SOLUTION INTRAVENOUS; SUBCUTANEOUS at 12:54

## 2023-06-08 RX ADMIN — ASPIRIN 81 MG: 81 TABLET, COATED ORAL at 09:27

## 2023-06-08 RX ADMIN — LISINOPRIL 20 MG: 20 TABLET ORAL at 09:28

## 2023-06-08 RX ADMIN — INSULIN LISPRO 4 UNITS: 100 INJECTION, SOLUTION INTRAVENOUS; SUBCUTANEOUS at 15:58

## 2023-06-08 RX ADMIN — INSULIN LISPRO 2 UNITS: 100 INJECTION, SOLUTION INTRAVENOUS; SUBCUTANEOUS at 02:10

## 2023-06-08 RX ADMIN — GADOBUTROL 14 ML: 604.72 INJECTION INTRAVENOUS at 22:23

## 2023-06-08 RX ADMIN — INSULIN LISPRO 1 UNITS: 100 INJECTION, SOLUTION INTRAVENOUS; SUBCUTANEOUS at 21:06

## 2023-06-08 RX ADMIN — ACETAMINOPHEN 975 MG: 325 TABLET, FILM COATED ORAL at 20:42

## 2023-06-08 RX ADMIN — PREGABALIN 75 MG: 75 CAPSULE ORAL at 18:42

## 2023-06-08 RX ADMIN — DOCUSATE SODIUM 100 MG: 100 CAPSULE, LIQUID FILLED ORAL at 18:42

## 2023-06-08 RX ADMIN — AMLODIPINE BESYLATE 5 MG: 5 TABLET ORAL at 09:27

## 2023-06-08 RX ADMIN — PREGABALIN 75 MG: 75 CAPSULE ORAL at 09:27

## 2023-06-08 RX ADMIN — INSULIN LISPRO 4 UNITS: 100 INJECTION, SOLUTION INTRAVENOUS; SUBCUTANEOUS at 08:23

## 2023-06-08 RX ADMIN — INSULIN GLARGINE 10 UNITS: 100 INJECTION, SOLUTION SUBCUTANEOUS at 09:39

## 2023-06-08 RX ADMIN — DOCUSATE SODIUM 100 MG: 100 CAPSULE, LIQUID FILLED ORAL at 09:29

## 2023-06-08 RX ADMIN — PIPERACILLIN SODIUM AND TAZOBACTAM SODIUM 3.38 G: 36; 4.5 INJECTION, POWDER, LYOPHILIZED, FOR SOLUTION INTRAVENOUS at 23:51

## 2023-06-08 RX ADMIN — PIPERACILLIN SODIUM AND TAZOBACTAM SODIUM 3.38 G: 36; 4.5 INJECTION, POWDER, LYOPHILIZED, FOR SOLUTION INTRAVENOUS at 19:04

## 2023-06-08 RX ADMIN — OXYCODONE HYDROCHLORIDE 10 MG: 10 TABLET ORAL at 01:04

## 2023-06-08 RX ADMIN — SPIRONOLACTONE 25 MG: 25 TABLET ORAL at 09:28

## 2023-06-08 RX ADMIN — ATORVASTATIN CALCIUM 40 MG: 40 TABLET, FILM COATED ORAL at 09:27

## 2023-06-08 RX ADMIN — HYDROCHLOROTHIAZIDE 25 MG: 25 TABLET ORAL at 09:27

## 2023-06-08 RX ADMIN — CLOPIDOGREL BISULFATE 75 MG: 75 TABLET ORAL at 09:28

## 2023-06-08 RX ADMIN — ACETAMINOPHEN 975 MG: 325 TABLET, FILM COATED ORAL at 06:25

## 2023-06-08 RX ADMIN — ENOXAPARIN SODIUM 40 MG: 40 INJECTION SUBCUTANEOUS at 09:27

## 2023-06-08 RX ADMIN — SENNOSIDES 8.6 MG: 8.6 TABLET, FILM COATED ORAL at 09:29

## 2023-06-08 RX ADMIN — PIPERACILLIN SODIUM AND TAZOBACTAM SODIUM 3.38 G: 36; 4.5 INJECTION, POWDER, LYOPHILIZED, FOR SOLUTION INTRAVENOUS at 06:25

## 2023-06-08 RX ADMIN — PIPERACILLIN SODIUM AND TAZOBACTAM SODIUM 3.38 G: 36; 4.5 INJECTION, POWDER, LYOPHILIZED, FOR SOLUTION INTRAVENOUS at 12:52

## 2023-06-08 NOTE — ASSESSMENT & PLAN NOTE
· Patient is on Aldactazide 25/25 and Lotrel  5-20 at home    · Improving as compared to before  · continue home anti hypertensive regimen  · Adequate pain control

## 2023-06-08 NOTE — ASSESSMENT & PLAN NOTE
· Patient presented with epigastric pain, radiating to back and lower abdomen associated with vomiting  · Likely 2/2 acute cholecystitis  · CT  Abdomen pelvis shows Gallbladder luminal distention with mild pericholecystic inflammation  No calcified gallstones  No biliary ductal dilation  Pancreas normal   · Lipase and amylase slightly elevated above normal range  · Lipid profile with normal TG level  · RUQ USG noted  · Status post lap hemant on 6/7  · Antibiotics per primary team   · Conservative management with Zofran, Mylanta and pain control  improve score of 5 on coumadin

## 2023-06-08 NOTE — ANESTHESIA PREPROCEDURE EVALUATION
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     Physical Exam    Airway    Mallampati score: II  TM Distance: >3 FB  Neck ROM: full     Dental   Comment: NONE LOOSE,     Cardiovascular      Pulmonary      Other Findings         Latest Reference Range & Units 06/07/23 05:38   Sodium 135 - 147 mmol/L 132 (L)   Potassium 3 5 - 5 3 mmol/L 3 8   Chloride 96 - 108 mmol/L 100   CO2 21 - 32 mmol/L 30   Anion Gap 4 - 13 mmol/L 2 (L)   BUN 5 - 25 mg/dL 13   Creatinine 0 60 - 1 30 mg/dL 1 00   Glucose, Random 65 - 140 mg/dL 154 (H)   Calcium 8 3 - 10 1 mg/dL 8 5   AST 5 - 45 U/L 60 (H)   ALT 12 - 78 U/L 103 (H)   Alkaline Phosphatase 46 - 116 U/L 79   Total Protein 6 4 - 8 4 g/dL 7 9   Albumin 3 5 - 5 0 g/dL 2 8 (L)   TOTAL BILIRUBIN 0 20 - 1 00 mg/dL 0 81   eGFR ml/min/1 73sq m 83   Magnesium 1 6 - 2 6 mg/dL 2 5   BILIRUBIN DIRECT 0 00 - 0 20 mg/dL 0 34 (H)   (L): Data is abnormally low  (H): Data is abnormally high    Accucheck @ 17:32 = 176      EKG (6/2023)  Sinus rhythm with frequent Premature ventricular complexes  Nonspecific ST abnormality  Abnormal ECG  No previous ECGs available    Anesthesia Plan  ASA Score- 3     Anesthesia Type- general with ASA Monitors  Additional Monitors:   Airway Plan: ETT  Comment: NPO AFTER MN  Plan Factors-Exercise tolerance (METS): <4 METS  Chart reviewed  Existing labs reviewed  Patient summary reviewed  Patient is a current smoker  Patient instructed to abstain from smoking on day of procedure  Patient did not smoke on day of surgery      Obstructive sleep apnea risk education given perioperatively  Induction- intravenous  Postoperative Plan- Plan for postoperative opioid use  Planned trial extubation    Informed Consent- Anesthetic plan and risks discussed with patient  I personally reviewed this patient with the CRNA  Discussed and agreed on the Anesthesia Plan with the CRNA  Kris Galloway

## 2023-06-08 NOTE — OP NOTE
OPERATIVE REPORT  PATIENT NAME: Shira Harris    :  1965  MRN: [id number]  Pt Location: BE OR ROOM 07    SURGERY DATE: 2023    Surgeon(s) and Role:     * Werner Brown DO - Primary     * Jose Carlos Figueroa MD     * Minnie Thorpe MD    Preop Diagnosis:  Acute cholecystitis [K81 0]    Post-Op Diagnosis Codes:     * Acute cholecystitis [K81 0]    Procedure(s):  CHOLECYSTECTOMY LAPAROSCOPIC    Specimen(s):  ID Type Source Tests Collected by Time Destination   1 :  Tissue Gallbladder TISSUE EXAM Werner Brown DO 2023        Estimated Blood Loss:   Minimal    Drains:  * No LDAs found *    Anesthesia Type:   General    Operative Indications:  Acute cholecystitis [K81 0]    Operative Findings:  · Bile peritonitis in RUQ concerning for perforation  · Distended and necrotic gallbladder w/ stone impacted at neck  · Thrombosed cystic artery    Complications:   None    Procedure and Technique:  Patient was brought to the operating room where he was identified verbally and via hospital supplied armband  He was transferred to the operating room table in the supine position  General anesthesia was induced  The patient was endotracheally intubated  The abdomen was prepped and draped in usual sterile fashion  A brief timeout was performed confirming the correct patient, procedure, side and with all parties in agreement we proceeded  A vertical supraumbilical incision was made using a #15 blade  The incision was deepened to the level of the fascia  The abdomen was entered using the open Fox technique  A 5 mm trocar was placed  The abdomen was insufflated with CO2 which patient tolerated well  Laparoscope was introduced into the abdomen and the right upper quadrant was inspected  There was evidence of bile peritonitis in the right upper quadrant  There were filmy adhesions between the colon and omentum to a distended and necrotic appearing gallbladder    3 additional trocars were placed under direct visualization  An 11 mm trocar was placed in the subxiphoid position and x2 a 5 mm trocars were placed in the right upper quadrant  We were unable to grasp the gallbladder for exposure/manipulation and therefore we needle decompressed it with aspiration of dark green bile  The gallbladder was then distracted over the dome of the liver  The peritoneum overlying the infundibulum was carefully cauterized and stripped  Just immediately distal to our presumed cystic structures there was significant inflammatory changes to the soft tissue surrounding what was likely the common bile duct junction  Fortunately, the cystic duct and artery were circumferentially dissected away from this  The cystic artery itself appeared already thrombosed, this was clipped x1 and divided with Bovie electrocautery near the gallbladder  The cystic duct was triply clipped, 2 proximally and 1 on the specimen side and then divided  Bovie electrocautery was then used to separate the gallbladder from the liver bed  Liver bed was carefully inspected for hemostasis prior to complete transection of the gallbladder from the liver  This was satisfactory  The gallbladder was then placed in a 10 mm Endo Catch bag and delivered through the subxiphoid port  In the process of doing so the Endo Catch bag did unexpectedly tear 2/2 to the impacted stone and the specimen was ultimately delivered intact without spillage  The gallbladder was then passed off the field for routine pathologic examination  The right upper quadrant was copiously irrigated with sterile saline and inspected for hemostasis again which was still satisfactory  The subxiphoid port fascia was then closed with 0 Vicryl stitch on a suture passer in a figure-of-eight fashion  The subxiphoid port was copiously irrigated with saline  The skin on all port sites was then closed with 4-0 Monocryl and surgical glue        General anesthesia was gently reversed and the patient was allowed to awaken whereupon they were extubated and returned to the PACU in stable condition  At the conclusion of the case all sponge, needle, instrument counts were correct and RF wanding was negative x2  Dr Priscilla Haskins was present for the entire procedure      Patient Disposition:  PACU  and extubated and stable        SIGNATURE: Ney Gallardo MD  DATE: June 7, 2023  TIME: 10:35 PM

## 2023-06-08 NOTE — ANESTHESIA POSTPROCEDURE EVALUATION
Post-Op Assessment Note    CV Status:  Stable    Pain management: adequate     Mental Status:  Awake and sleepy   Hydration Status:  Euvolemic   PONV Controlled:  Controlled   Airway Patency:  Patent      Post Op Vitals Reviewed: Yes      Staff: Anesthesiologist, CRNA         There were no known notable events for this encounter      /92 (06/07/23 2310)    Temp 98 2 °F (36 8 °C) (06/07/23 2310)    Pulse 87 (06/07/23 2310)   Resp 20 (06/07/23 2310)    SpO2 99 % (06/07/23 2310)

## 2023-06-08 NOTE — ASSESSMENT & PLAN NOTE
History noted, patient has stopped taking aspirin and statin at home  With residual left sided weakness  Resumed while in hospital, encouraged on compliance

## 2023-06-08 NOTE — PROGRESS NOTES
[facility]  Progress Note  Name: Bee Limon  MRN: [id number]  [facility] Date of Admission: 6/5/2023   Date of Service: 6/8/2023  Hospital Day: 3    Assessment/Plan   * Epigastric pain  Assessment & Plan  · Patient presented with epigastric pain, radiating to back and lower abdomen associated with vomiting  · Likely 2/2 acute cholecystitis  · CT  Abdomen pelvis shows Gallbladder luminal distention with mild pericholecystic inflammation  No calcified gallstones  No biliary ductal dilation  Pancreas normal   · Lipase and amylase slightly elevated above normal range  · Lipid profile with normal TG level  · RUQ USG noted  · Status post lap hemant on 6/7  · Antibiotics per primary team   · Conservative management with Zofran, Mylanta and pain control  Primary hypertension  Assessment & Plan  · Patient is on Aldactazide 25/25 and Lotrel  5-20 at home  · Improving as compared to before  · continue home anti hypertensive regimen  · Adequate pain control      DM (diabetes mellitus), type 2 [facility])  Assessment & Plan  Lab Results   Component Value Date    HGBA1C 7 0 (H) 04/06/2023       Recent Labs     06/08/23  0208 06/08/23  0626 06/08/23  0818 06/08/23  1208   POCGLU 201* 227* 206* 206*       Blood Sugar Average: Last 72 hrs:  (P) 182     · Patient is on metformin, glipizide and Januvia at home  · sliding scale insulin while inpatient  · Added lantus as diet has been started  · Continue hypoglycemia protocol  · Continue Lyrica at home dosage  PAD (peripheral artery disease) ([facility] [address] )  Assessment & Plan  · Patient is on aspirin, Plavix and statin at home that he has stopped taking as he ran out of prescription  · Continue aspirin, Plavix and statin while in hospital     CVA (cerebral vascular accident) [facility])  Assessment & Plan  History noted, patient has stopped taking aspirin and statin at home    With residual left sided weakness  Resumed while in [facility], encouraged on compliance  Hyperlipidemia  Assessment & Plan  · Patient is on atorvastatin at home that he has stopped taking, will resume while inpatient  · He was encouraged on being compliant with his medications  Morbid obesity with BMI of 40 0-44 9, adult ([facility] [address] )  Assessment & Plan  · Patient's BMI 40  87  · Encouraged on lifestyle modification including diet changes and regular exercise, per patient he tries to exercise every day but have been noncompliant with diet changes  Obstructive sleep apnea syndrome  Assessment & Plan  CPAP at nighttime  [address] Internal Medicine Consultation Follow Up Progress Note  Patient: Red Abraham 66 y o  male   MRN: [id number]  PCP: RENNY Gee  Unit/Bed#: [facility] 647-02 Encounter: [id number]  Date Of Visit: 23    Primary Service: Panfilo Lagunas DO  Reason for Consultation: medical mx    Assessment & Plan:    · As above      Time Spent for Care: 30 minutes  More than 50% of total time spent on counseling and coordination of care as described above  Subjective:   No overnight events  Ambulates around epr patient  Reports diarrhea post hemant  No abdominal pain  tolerating diet  Objective:     Vitals:   Temp (24hrs), Av 3 °F (36 8 °C), Min:97 9 °F (36 6 °C), Max:99 °F (37 2 °C)    Temp:  [97 9 °F (36 6 °C)-99 °F (37 2 °C)] 99 °F (37 2 °C)  HR:  [84-94] 94  Resp:  [18-20] 18  BP: (137-195)/(81-99) 156/90  SpO2:  [92 %-99 %] 96 %  Body mass index is 40 87 kg/m²  Input and Output Summary (last 24 hours): Intake/Output Summary (Last 24 hours) at 2023 1458  Last data filed at 2023 0900  Gross per 24 hour   Intake  38 ml   Output 855 ml   Net 1174 38 ml       Physical Exam:     Physical Exam  Constitutional:       General: He is not in acute distress  Cardiovascular:      Rate and Rhythm: Normal rate and regular rhythm       Heart sounds: Normal heart sounds  No murmur heard    Pulmonary:    Effort: No respiratory distress       Breath sounds: Normal breath sounds  No wheezing or rales  Abdominal:      General: Bowel sounds are normal  There is no distension       Palpations: Abdomen is soft       Tenderness: There is no tenderness  Skin:     General: Skin is warm  Neurological:      Mental Status: He is alert       Comments: Awake alert and communicative  Baseline left sided weakness noted     Additional Data:     Results from last 7 days   Lab Units 06/08/23  0536   EOS PCT % 0   HEMATOCRIT % 41 8   HEMOGLOBIN g/dL 13 4   LYMPHS PCT % 4*   MONOS PCT % 4   NEUTROS PCT % 91*   PLATELETS Thousands/uL 209   WBC Thousand/uL 18 00*     Results from last 7 days   Lab Units 06/08/23  0536 06/07/23  0538   ALK PHOS U/L  --  79   ALT U/L  --  103*   AST U/L  --  60*   BUN mg/dL 16 13   CALCIUM mg/dL 8 3 8 5   CHLORIDE mmol/L 101 100   CO2 mmol/L 28 30   CREATININE mg/dL 1 09 1 00   POTASSIUM mmol/L 4 0 3 8           * I Have Reviewed All Lab Data Listed Above  * Additional Pertinent Lab Tests Reviewed: All Labs Within Last 24 Hours Reviewed    Imaging: I have personally reviewed pertinent reports        Last 24 Hours Medication List:   Current Facility-Administered Medications   Medication Dose Route Frequency Provider Last Rate   • acetaminophen  975 mg Oral Q8H [address] Pedro Luis Valles MD     • aluminum-magnesium hydroxide-simethicone  30 mL Oral Q6H PRN Pedro Luis Valles MD     • amLODIPine  5 mg Oral Daily Pedro Luis Valles MD      And   • lisinopril  20 mg Oral Daily Pedro Luis Valles MD     • ammonium lactate   Topical Daily Pedro Luis Valles MD     • aspirin  81 mg Oral Daily Pedro Luis Valles MD     • atorvastatin  40 mg Oral Daily Pedro Luis Valles MD     • clopidogrel  75 mg Oral Daily Pedro Luis Valles MD     • docusate sodium  100 mg Oral BID Pedro Luis Valles MD     • enoxaparin  40 mg Subcutaneous Daily Pedro Luis Valles MD     • spironolactone  25 mg Oral Daily Pedro Luis Valles MD      And   • hydrochlorothiazide  25 mg Oral Daily Jonny Lozada MD     • HYDROmorphone  0 5 mg Intravenous Q4H PRN Jonny Lozada MD     • insulin glargine  10 Units Subcutaneous QAM Joseline Nunez MD     • insulin lispro  1-5 Units Subcutaneous HS Joseline Nunez MD     • insulin lispro  2-12 Units Subcutaneous TID With Meals Joseline Nunez MD     • lactated ringers  125 mL/hr Intravenous Continuous Selestino Saima CRNA Stopped (06/08/23 7775)   • metoprolol  2 5 mg Intravenous Q6H PRN Jonny Lozada MD     • multi-electrolyte  100 mL/hr Intravenous Continuous Jonny Lozada MD Stopped (06/08/23 1257)   • nicotine  1 patch Transdermal Daily Jonny Lozada MD     • ondansetron  4 mg Intravenous Q6H PRN Jonny Lozada MD     • oxyCODONE  10 mg Oral Q4H PRN Jonny Lozada MD     • oxyCODONE  5 mg Oral Q4H PRN Jonny Lozada MD     • piperacillin-tazobactam  3 375 g Intravenous Q6H Ana Maria Montalvo MD 3 375 g (06/08/23 1252)   • pregabalin  75 mg Oral BID Jonny Lozada MD     • senna  1 tablet Oral Daily Jonny Lozada MD          Today, Patient Was Seen By: Joseline Nunez MD    ** Please Note: This note has been constructed using a voice recognition system   **

## 2023-06-08 NOTE — ASSESSMENT & PLAN NOTE
Lab Results   Component Value Date    HGBA1C 7 0 (H) 04/06/2023       Recent Labs     06/08/23  0208 06/08/23  0626 06/08/23  0818 06/08/23  1208   POCGLU 201* 227* 206* 206*       Blood Sugar Average: Last 72 hrs:  (P) 182     · Patient is on metformin, glipizide and Januvia at home  · sliding scale insulin while inpatient  · Added lantus as diet has been started  · Continue hypoglycemia protocol  · Continue Lyrica at home dosage

## 2023-06-08 NOTE — PROGRESS NOTES
"Progress Note -General surgery  Dary Britt 66 y o  male MRN: [id number]  Unit/Bed#: [facility] 813-01 Encounter: [id number]    Assessment:  66 y o  male with acute cholecystitis s/p lap hemant 6/7    Plan:  - Diet Layo/CHO Controlled; Consistent Carbohydrate Diet Level 2 (5 carb servings/75 grams CHO/meal)  - Pain and Nausea control PRN  - f/u AM labs  - Incentive spirometry  - OOB, ambulate  - cont IV zosyn    Subjective/Objective     Subjective: No acute events overnight  Tolerating diet so far, no n/v  Pain is minimal  Feeling well this morning  Voiding  Having BMs  Objective:   Vitals: Blood pressure 137/82, pulse 84, temperature 98 7 °F (37 1 °C), resp  rate 20, height 6' 2\" (1 88 m), weight (!) 144 kg (318 lb 5 5 oz), SpO2 96 %  ,Body mass index is 40 87 kg/m²  I/O       06/05 0701  06/06 0700 06/06 0701  06/07 0700    P  O   0    I V  (mL/kg) 900 (6 3)     Total Intake(mL/kg) 900 (6 3) 0 (0)    Urine (mL/kg/hr) 430 1050 (0 5)    Total Output 430 1050    Net +470 -1050                Physical Exam:  General: No acute distress  Neuro: alert and oriented  HEENT: moist mucous membranes  CV: Well perfused, regular rate and rhythm  Lungs: Normal work of breathing, no increased respiratory effort  Abdomen: Soft, non-tender, non-distended  Incisions clean, dry and intact  Extremities: No edema, clubbing or cyanosis  Skin: Warm, dry        Lab, Imaging and other studies:   I have personally reviewed pertinent reports    , CBC with diff:   No results found for: \"ADJUSTEDWBC\", \"HCT\", \"HGB\", \"MCH\", \"MCHC\", \"MCV\", \"MPV\", \"NRBC\", \"PLT\", \"RBC\", \"RDW\", \"WBC\", BMP/CMP:   No results found for: \"ALKPHOS\", \"ALT\", \"ANIONGAP\", \"AST\", \"BILITOT\", \"BUN\", \"CALCIUM\", \"CL\", \"CO2\", \"CREATININE\", \"EGFR\", \"GLUCOSE\", \"K\", \"PROT\", \"SODIUM\", Magnesium: No components found for: \"MAG\"  VTE Pharmacologic Prophylaxis: Heparin  VTE Mechanical Prophylaxis: sequential compression device        Shaun Moya MD  6/8/2023  6:14 AM    "

## 2023-06-09 LAB
ALBUMIN SERPL BCP-MCNC: 2.2 G/DL (ref 3.5–5)
ALP SERPL-CCNC: 65 U/L (ref 46–116)
ALT SERPL W P-5'-P-CCNC: 60 U/L (ref 12–78)
ANION GAP SERPL CALCULATED.3IONS-SCNC: 1 MMOL/L (ref 4–13)
AST SERPL W P-5'-P-CCNC: 36 U/L (ref 5–45)
BASOPHILS # BLD AUTO: 0.04 THOUSANDS/ÂΜL (ref 0–0.1)
BASOPHILS NFR BLD AUTO: 0 % (ref 0–1)
BILIRUB SERPL-MCNC: 0.27 MG/DL (ref 0.2–1)
BUN SERPL-MCNC: 19 MG/DL (ref 5–25)
CALCIUM ALBUM COR SERPL-MCNC: 9.5 MG/DL (ref 8.3–10.1)
CALCIUM SERPL-MCNC: 8.1 MG/DL (ref 8.3–10.1)
CHLORIDE SERPL-SCNC: 105 MMOL/L (ref 96–108)
CO2 SERPL-SCNC: 31 MMOL/L (ref 21–32)
CREAT SERPL-MCNC: 0.86 MG/DL (ref 0.6–1.3)
EOSINOPHIL # BLD AUTO: 0.06 THOUSAND/ÂΜL (ref 0–0.61)
EOSINOPHIL NFR BLD AUTO: 0 % (ref 0–6)
ERYTHROCYTE [DISTWIDTH] IN BLOOD BY AUTOMATED COUNT: 13.5 % (ref 11.6–15.1)
GFR SERPL CREATININE-BSD FRML MDRD: 96 ML/MIN/1.73SQ M
GLUCOSE SERPL-MCNC: 149 MG/DL (ref 65–140)
GLUCOSE SERPL-MCNC: 155 MG/DL (ref 65–140)
GLUCOSE SERPL-MCNC: 162 MG/DL (ref 65–140)
GLUCOSE SERPL-MCNC: 175 MG/DL (ref 65–140)
GLUCOSE SERPL-MCNC: 210 MG/DL (ref 65–140)
HCT VFR BLD AUTO: 37.1 % (ref 36.5–49.3)
HGB BLD-MCNC: 12 G/DL (ref 12–17)
IMM GRANULOCYTES # BLD AUTO: 0.09 THOUSAND/UL (ref 0–0.2)
IMM GRANULOCYTES NFR BLD AUTO: 1 % (ref 0–2)
LYMPHOCYTES # BLD AUTO: 2.65 THOUSANDS/ÂΜL (ref 0.6–4.47)
LYMPHOCYTES NFR BLD AUTO: 15 % (ref 14–44)
MCH RBC QN AUTO: 28 PG (ref 26.8–34.3)
MCHC RBC AUTO-ENTMCNC: 32.3 G/DL (ref 31.4–37.4)
MCV RBC AUTO: 87 FL (ref 82–98)
MONOCYTES # BLD AUTO: 1.26 THOUSAND/ÂΜL (ref 0.17–1.22)
MONOCYTES NFR BLD AUTO: 7 % (ref 4–12)
NEUTROPHILS # BLD AUTO: 13.97 THOUSANDS/ÂΜL (ref 1.85–7.62)
NEUTS SEG NFR BLD AUTO: 77 % (ref 43–75)
NRBC BLD AUTO-RTO: 0 /100 WBCS
PLATELET # BLD AUTO: 215 THOUSANDS/UL (ref 149–390)
PMV BLD AUTO: 11.5 FL (ref 8.9–12.7)
POTASSIUM SERPL-SCNC: 3.7 MMOL/L (ref 3.5–5.3)
PROT SERPL-MCNC: 6.9 G/DL (ref 6.4–8.4)
RBC # BLD AUTO: 4.29 MILLION/UL (ref 3.88–5.62)
SODIUM SERPL-SCNC: 137 MMOL/L (ref 135–147)
WBC # BLD AUTO: 18.07 THOUSAND/UL (ref 4.31–10.16)

## 2023-06-09 PROCEDURE — 85025 COMPLETE CBC W/AUTO DIFF WBC: CPT

## 2023-06-09 PROCEDURE — 82948 REAGENT STRIP/BLOOD GLUCOSE: CPT

## 2023-06-09 PROCEDURE — 99024 POSTOP FOLLOW-UP VISIT: CPT | Performed by: SURGERY

## 2023-06-09 PROCEDURE — 94660 CPAP INITIATION&MGMT: CPT

## 2023-06-09 PROCEDURE — NC001 PR NO CHARGE: Performed by: PHYSICIAN ASSISTANT

## 2023-06-09 PROCEDURE — 99232 SBSQ HOSP IP/OBS MODERATE 35: CPT | Performed by: INTERNAL MEDICINE

## 2023-06-09 PROCEDURE — 94760 N-INVAS EAR/PLS OXIMETRY 1: CPT

## 2023-06-09 PROCEDURE — 80053 COMPREHEN METABOLIC PANEL: CPT

## 2023-06-09 RX ORDER — METRONIDAZOLE 500 MG/1
500 TABLET ORAL EVERY 8 HOURS SCHEDULED
Qty: 6 TABLET | Refills: 0 | Status: SHIPPED | OUTPATIENT
Start: 2023-06-09 | End: 2023-06-11

## 2023-06-09 RX ORDER — DOCUSATE SODIUM 100 MG/1
100 CAPSULE, LIQUID FILLED ORAL 2 TIMES DAILY
Qty: 10 CAPSULE | Refills: 0 | Status: SHIPPED | OUTPATIENT
Start: 2023-06-09 | End: 2023-06-14

## 2023-06-09 RX ORDER — ACETAMINOPHEN 325 MG/1
650 TABLET ORAL EVERY 4 HOURS PRN
Refills: 0
Start: 2023-06-09

## 2023-06-09 RX ORDER — OXYCODONE HYDROCHLORIDE 5 MG/1
5-10 TABLET ORAL EVERY 4 HOURS PRN
Qty: 36 TABLET | Refills: 0 | Status: SHIPPED | OUTPATIENT
Start: 2023-06-09 | End: 2023-06-12

## 2023-06-09 RX ORDER — CEPHALEXIN 500 MG/1
500 CAPSULE ORAL EVERY 6 HOURS SCHEDULED
Qty: 8 CAPSULE | Refills: 0 | Status: SHIPPED | OUTPATIENT
Start: 2023-06-09 | End: 2023-06-11

## 2023-06-09 RX ORDER — INSULIN LISPRO 100 [IU]/ML
3 INJECTION, SOLUTION INTRAVENOUS; SUBCUTANEOUS
Status: DISCONTINUED | OUTPATIENT
Start: 2023-06-09 | End: 2023-06-10 | Stop reason: HOSPADM

## 2023-06-09 RX ORDER — MAGNESIUM HYDROXIDE/ALUMINUM HYDROXICE/SIMETHICONE 120; 1200; 1200 MG/30ML; MG/30ML; MG/30ML
30 SUSPENSION ORAL EVERY 6 HOURS PRN
Refills: 0
Start: 2023-06-09

## 2023-06-09 RX ADMIN — PIPERACILLIN SODIUM AND TAZOBACTAM SODIUM 3.38 G: 36; 4.5 INJECTION, POWDER, LYOPHILIZED, FOR SOLUTION INTRAVENOUS at 17:09

## 2023-06-09 RX ADMIN — INSULIN GLARGINE 10 UNITS: 100 INJECTION, SOLUTION SUBCUTANEOUS at 09:28

## 2023-06-09 RX ADMIN — INSULIN LISPRO 4 UNITS: 100 INJECTION, SOLUTION INTRAVENOUS; SUBCUTANEOUS at 11:49

## 2023-06-09 RX ADMIN — SPIRONOLACTONE 25 MG: 25 TABLET ORAL at 09:29

## 2023-06-09 RX ADMIN — ACETAMINOPHEN 975 MG: 325 TABLET, FILM COATED ORAL at 13:27

## 2023-06-09 RX ADMIN — INSULIN LISPRO 3 UNITS: 100 INJECTION, SOLUTION INTRAVENOUS; SUBCUTANEOUS at 17:11

## 2023-06-09 RX ADMIN — PREGABALIN 75 MG: 75 CAPSULE ORAL at 09:29

## 2023-06-09 RX ADMIN — LISINOPRIL 20 MG: 20 TABLET ORAL at 09:29

## 2023-06-09 RX ADMIN — PIPERACILLIN SODIUM AND TAZOBACTAM SODIUM 3.38 G: 36; 4.5 INJECTION, POWDER, LYOPHILIZED, FOR SOLUTION INTRAVENOUS at 06:34

## 2023-06-09 RX ADMIN — INSULIN LISPRO 1 UNITS: 100 INJECTION, SOLUTION INTRAVENOUS; SUBCUTANEOUS at 21:59

## 2023-06-09 RX ADMIN — ASPIRIN 81 MG: 81 TABLET, COATED ORAL at 09:29

## 2023-06-09 RX ADMIN — CLOPIDOGREL BISULFATE 75 MG: 75 TABLET ORAL at 09:29

## 2023-06-09 RX ADMIN — ATORVASTATIN CALCIUM 40 MG: 40 TABLET, FILM COATED ORAL at 09:29

## 2023-06-09 RX ADMIN — AMLODIPINE BESYLATE 5 MG: 5 TABLET ORAL at 09:29

## 2023-06-09 RX ADMIN — ENOXAPARIN SODIUM 40 MG: 40 INJECTION SUBCUTANEOUS at 09:30

## 2023-06-09 RX ADMIN — ACETAMINOPHEN 975 MG: 325 TABLET, FILM COATED ORAL at 06:34

## 2023-06-09 RX ADMIN — HYDROCHLOROTHIAZIDE 25 MG: 25 TABLET ORAL at 09:29

## 2023-06-09 RX ADMIN — PREGABALIN 75 MG: 75 CAPSULE ORAL at 17:09

## 2023-06-09 RX ADMIN — PIPERACILLIN SODIUM AND TAZOBACTAM SODIUM 3.38 G: 36; 4.5 INJECTION, POWDER, LYOPHILIZED, FOR SOLUTION INTRAVENOUS at 11:52

## 2023-06-09 RX ADMIN — INSULIN LISPRO 2 UNITS: 100 INJECTION, SOLUTION INTRAVENOUS; SUBCUTANEOUS at 09:28

## 2023-06-09 NOTE — ASSESSMENT & PLAN NOTE
RN called  OF [facility] at this time to initiate consult/transfer to [address] [place] [address] [facility] [place]  Spoke with ARGELIA Cruz RN  03/31/21 4977 · Patient presented with epigastric pain, radiating to back and lower abdomen associated with vomiting  · Likely 2/2 acute cholecystitis  · CT  Abdomen pelvis shows Gallbladder luminal distention with mild pericholecystic inflammation  No calcified gallstones  No biliary ductal dilation  Pancreas normal   · Lipase and amylase slightly elevated above normal range  · Lipid profile with normal TG level  · RUQ USG noted  · Status post lap hemant on 6/7  · Antibiotics per primary team   · likely post hemant diarrhea>> d/w primary team  Mx per them  consider po imodium prn  Less likely c diff  · Conservative management with Zofran, Mylanta and pain control

## 2023-06-09 NOTE — CASE MANAGEMENT
Case Management Assessment & Discharge Planning Note    Patient name Wallace Glover  Location [address] 813/PPHP 747-02 MRN [id number]  : 1965 Date 2023       Current Admission Date: 2023  Current Admission Diagnosis:Epigastric pain   Patient Active Problem List    Diagnosis Date Noted   • Nausea and vomiting    • Abdominal pain    • Epigastric pain 2023   • Primary hypertension 2023   • Morbid obesity with BMI of 40 0-44 9, adult ([place] [address] ) 2020   • Hyperlipidemia 2020   • CVA (cerebral vascular accident) ([place] [address] ) 2020   • Weakness of extremity 2020   • PAD (peripheral artery disease) ([place] [address] ) 2020   • Carotid stenosis 2020   • DM (diabetes mellitus), type 2 ([place] [address] ) 2020   • Obstructive sleep apnea syndrome    • Daytime sleepiness       LOS (days): 4  Geometric Mean LOS (GMLOS) (days): 5 00  Days to GMLOS:1     OBJECTIVE:    Risk of Unplanned Readmission Score: 13 19         Current admission status: Inpatient       Preferred Pharmacy:   [address], [address] [facility] [address] [address]  Phone: [phone] Fax: [address] [place] [address]  [address] [facility] [address]  Phone: [phone] Fax: [phone]    Primary Care Provider: RENNY Cao    Primary Insurance: [facility] REP  Secondary Insurance:     ASSESSMENT:  [address] [facility], [address] [facility] Representative - Son   Primary Phone: [phone] (Mobile)               Advance Directives  Does patient have a [address]?: No  Was patient offered paperwork?: Yes (declined)  Does patient currently have a Health Care decision maker?: Yes, please see Health Care Proxy section  Does patient have Advance Directives?: No  Was patient offered paperwork?: Yes (declined)  Primary Contact: Melissa grullon [phone]         Readmission Root Cause  30 Day Readmission: No    Patient Information  Admitted from[de-identified] Home  Mental Status: Alert  During Assessment patient was accompanied by: Not accompanied during assessment  Assessment information provided by[de-identified] Patient  Primary Caregiver: Self  Support Systems: Son  What city do you live in?: [address] entry access options   Select all that apply : Stairs  Number of steps to enter home : 4  Do the steps have railings?: Yes  Type of Current Residence: [place]  In the last 12 months, was there a time when you were not able to pay the mortgage or rent on time?: No  In the last 12 months, how many places have you lived?: 1  In the last 12 months, was there a time when you did not have a steady place to sleep or slept in a shelter (including now)?: No  Homeless/housing insecurity resource given?: N/A  Living Arrangements: Lives w/ Son    Activities of Daily Living Prior to Admission  Functional Status: Assistance  Completes ADLs independently?: No  Level of ADL dependence: Assistance  Ambulates independently?: Yes  Does patient use assisted devices?: Yes  Assisted Devices (DME) used: Quad Cane, Other (Comment) (power chair)  Does patient have a history of Outpatient Therapy (PT/OT)?: Yes  Does the patient have a history of Short-Term Rehab?: No  Does patient have a history of HHC?: Yes (unable to recall)  Does patient currently have [facility]?: No         Patient Information Continued  Income Source: SSI/SSD  Does patient have prescription coverage?: Yes  Within the past 12 months, you worried that your food would run out before you got the money to buy more : Never true  Within the past 12 months, the food you bought just didn't last and you didn't have money to get more : Never true  Food insecurity resource given?: N/A  Does patient receive dialysis treatments?: No  Does patient have a history of substance abuse?: No  Does patient have a history of Mental Health Diagnosis?: No         Means of Transportation  Means of Transport to [facility][de-identified] Family transport  In the past 12 months, has lack of transportation kept you from medical appointments or from getting medications?: No  In the past 12 months, has lack of transportation kept you from meetings, work, or from getting things needed for daily living?: No  Was application for public transport provided?: N/A        DISCHARGE DETAILS:    Discharge planning discussed with[de-identified] patient at bedside        CM contacted family/caregiver?: No- see comments (declined)             Contacts  Patient Contacts: sonSaleem [phone]  Relationship to Patient[de-identified] Family  Contact Method: Phone  Reason/Outcome: Emergency Contact, Continuity of [address]         Is the patient interested in [facility] at discharge?: No    DME Referral Provided  Referral made for DME?: No         Would you like to participate in our [facility] [address] service program?  : No - Declined       Discharge Destination Plan[de-identified] Home           IMM Given (Date):: 06/09/23  IMM Given to[de-identified] Patient     Additional Comments: Met with pt to introduce CM role, obtain baseline assessment information and discuss DCP  Pt lives w/ son in one floor home w/ 4STE  Pt uses motorized chair and quad cane  Son assists with ADLS as needed and drives   No d/c needs evaluated

## 2023-06-09 NOTE — PROGRESS NOTES
[facility] [facility]  Progress Note  Name: Zuleika Lee  MRN: [id number]  Unit/Bed#: [facility] 367-82 I Date of Admission: 6/5/2023   Date of Service: 6/9/2023 I Hospital Day: 4    Assessment/Plan   * Epigastric pain  Assessment & Plan  · Patient presented with epigastric pain, radiating to back and lower abdomen associated with vomiting  · Likely 2/2 acute cholecystitis  · CT  Abdomen pelvis shows Gallbladder luminal distention with mild pericholecystic inflammation  No calcified gallstones  No biliary ductal dilation  Pancreas normal   · Lipase and amylase slightly elevated above normal range  · Lipid profile with normal TG level  · RUQ USG noted  · Status post lap hemant on 6/7  · Antibiotics per primary team   · likely post hemant diarrhea>> d/w primary team  Mx per them  consider po imodium prn  Less likely c diff  · Conservative management with Zofran, Mylanta and pain control  Primary hypertension  Assessment & Plan  · Patient is on Aldactazide 25/25 and Lotrel  5-20 at home  · Improving as compared to before  · continue home anti hypertensive regimen  · Adequate pain control      [facility]  Assessment & Plan  · Patient is on aspirin, Plavix and statin at home that he has stopped taking as he ran out of prescription  · Continue aspirin, Plavix and statin while in hospital     Hyperlipidemia  Assessment & Plan  · Patient is on atorvastatin at home that he has stopped taking, will resume while inpatient  · He was encouraged on being compliant with his medications  Morbid obesity with BMI of 40 0-44 9, adult ([facility] )  Assessment & Plan  · Patient's BMI 40  87  · Encouraged on lifestyle modification including diet changes and regular exercise, per patient he tries to exercise every day but have been noncompliant with diet changes  Obstructive sleep apnea syndrome  Assessment & Plan  CPAP at nighttime              [facility] Consultation Follow Up Progress Note  Patient: Raj Kebede 66 y o  male   MRN: [id number]  PCP: RENNY Herrera  Unit/Bed#: [facility] [address] Encounter: [id number]  Date Of Visit: 23    Primary Service: Wander Olivares DO  Reason for Consultation: medical Mx    Assessment & Plan:    · As above    VTE Pharmacologic Prophylaxis: Enoxaparin (Lovenox) / sequential compression device    Was care plan discussed with the Primary service today?: Yes    Education and Discussions with Family / Patient: plan of care, patient    Time Spent for Care: 30 minutes  More than 50% of total time spent on counseling and coordination of care as described above  Subjective:   No overnight events  Reports diarrhea post hemant  No fever or chills  No abdominal pain  Objective:     Vitals:   Temp (24hrs), Av 4 °F (36 9 °C), Min:97 7 °F (36 5 °C), Max:99 4 °F (37 4 °C)    Temp:  [97 7 °F (36 5 °C)-99 4 °F (37 4 °C)] 97 7 °F (36 5 °C)  HR:  [70-87] 70  Resp:  [16-20] 16  BP: (149-155)/(81-84) 155/81  SpO2:  [94 %-98 %] 97 %  Body mass index is 40 87 kg/m²  Input and Output Summary (last 24 hours): Intake/Output Summary (Last 24 hours) at 2023 1432  Last data filed at 2023 1327  Gross per 24 hour   Intake 820 ml   Output 650 ml   Net 170 ml       Physical Exam:     Physical Exam  Constitutional:       General: He is not in acute distress  Cardiovascular:      Rate and Rhythm: Normal rate and regular rhythm       Heart sounds: Normal heart sounds  No murmur heard  Pulmonary:      Effort: No respiratory distress       Breath sounds: Normal breath sounds  No wheezing or rales  Abdominal:      General: Bowel sounds are normal  There is no distension       Palpations: Abdomen is soft       Tenderness: There is no tenderness  Skin:     General: Skin is warm     Neurological:      Mental Status: He is alert       Comments: Awake alert and communicative  Baseline left sided weakness noted     Additional Data:     Results from last 7 days   Lab Units 06/09/23  0512   EOS PCT % 0   HEMATOCRIT % 37 1   HEMOGLOBIN g/dL 12 0   LYMPHS PCT % 15   MONOS PCT % 7   NEUTROS PCT % 77*   PLATELETS Thousands/uL 215   WBC Thousand/uL 18 07*     Results from last 7 days   Lab Units 06/09/23  0512   ALK PHOS U/L 65   ALT U/L 60   AST U/L 36   BUN mg/dL 19   CALCIUM mg/dL 8 1*   CHLORIDE mmol/L 105   CO2 mmol/L 31   CREATININE mg/dL 0 86   POTASSIUM mmol/L 3 7           * I Have Reviewed All Lab Data Listed Above  * Additional Pertinent Lab Tests Reviewed:  All Labs Within Last 24 Hours Reviewed      Last 24 Hours Medication List:   Current Facility-Administered Medications   Medication Dose Route Frequency Provider Last Rate   • acetaminophen  975 mg Oral Q8H [facility] Claritza Powell MD     • aluminum-magnesium hydroxide-simethicone  30 mL Oral Q6H PRN Claritza Powell MD     • amLODIPine  5 mg Oral Daily Claritza Powell MD      And   • lisinopril  20 mg Oral Daily Claritza Powell MD     • ammonium lactate   Topical Daily Claritza Powell MD     • aspirin  81 mg Oral Daily Claritza Powell MD     • atorvastatin  40 mg Oral Daily Claritza Powell MD     • clopidogrel  75 mg Oral Daily Claritza Powell MD     • docusate sodium  100 mg Oral BID Claritza Powell MD     • enoxaparin  40 mg Subcutaneous Daily Claritza Powell MD     • spironolactone  25 mg Oral Daily Claritza Powell MD      And   • hydrochlorothiazide  25 mg Oral Daily Claritza Powell MD     • HYDROmorphone  0 5 mg Intravenous Q4H PRN Claritza Powell MD     • insulin glargine  10 Units Subcutaneous QAM Mookie Mtz MD     • insulin lispro  1-5 Units Subcutaneous HS Mookie Mtz MD     • insulin lispro  2-12 Units Subcutaneous TID With Meals Mookie Mtz MD     • insulin lispro  3 Units Subcutaneous TID With Meals Mookie Mtz MD     • metoprolol  2 5 mg Intravenous Q6H PRN Claritza Powell MD     • nicotine  1 patch Transdermal Daily Claritza Powell MD     • ondansetron  4 mg Intravenous Q6H PRN Ryann Lawson MD     • oxyCODONE  10 mg Oral Q4H PRN Ryann Lawson MD     • oxyCODONE  5 mg Oral Q4H PRN Ryann Lawson MD     • piperacillin-tazobactam  3 375 g Intravenous Q6H Isreal Kumari MD 3 375 g (06/09/23 1152)   • pregabalin  75 mg Oral BID Ryann Lawson MD     • senna  1 tablet Oral Daily Ryann Lawson MD          Today, Patient Was Seen By: Oren Archer MD    ** Please Note: This note has been constructed using a voice recognition system   **

## 2023-06-09 NOTE — DISCHARGE SUMMARY
Discharge Summary - General Surgery   Lindsey Gongora 66 y o  male MRN: [id number]  Unit/Bed#: [facility] 492-38 Encounter: [id number]    Admission Date: 6/5/2023     Discharge Date: 6/9/2023     Admitting Diagnosis: Acute pancreatitis [K85 90]  Abdominal pain [R10 9]  Nausea and vomiting [R11 2]  N&V (nausea and vomiting) [R11 2]    Discharge Diagnosis:     1  Perforated gallbladder  2   Type 2 diabetes mellitus  3   Hypertension  4   Morbid obesity  5   Obstructive sleep apnea  6  PAD  7   Hyperlipidemia  Attending and Service: Dr Rossy Wilkins, Acute Care Surgical Services  Consulting Physician(s): Internal medicine    Imaging and Procedures Performed:   Orders Placed This Encounter   Procedures   • ED ECG Documentation Only   • POC Renal US       MRI abdomen w wo contrast and mrcp    Result Date: 6/9/2023  Impression: Simple free fluid in the cholecystectomy bed  Workstation performed: [id number]     US right upper quadrant    Result Date: 6/6/2023  Impression: Cholelithiasis  No gallbladder wall thickening  Sonographic Sharlyne Rise sign is negative  A small amount of pericholecystic fluid is seen, clinical significance is uncertain given presence of small volume ascites, as seen on 6/5/2023  Findings are equivocal for acute cholecystitis  Clinical correlation is recommended  HIDA scan can be performed for further evaluation as clinically warranted No intrahepatic or extrahepatic biliary ductal dilation  Suboptimal visualization of the liver and pancreas, limiting evaluation The study was marked in EPIC for immediate notification  Workstation performed: [id number]     CT abdomen pelvis w contrast    Result Date: 6/6/2023  Impression: 1  Gallbladder luminal distention with mild pericholecystic inflammation  No calcified gallstones  Patient's normal alkaline phosphatase and bilirubin, and leukocytosis is noted  Findings are equivocal for acute cholecystitis   2   Trace ascites in the right upper quadrant and pelvis is of uncertain etiology  3   No bowel obstruction  4   Normal appearance of the pancreas  Workstation performed: [id number]     Laparoscopic cholecystectomy on 6/7/2023  Pathology: Final surgical pathology pending at the time of discharge  Hospital Course: Shira Harris is a 82-year-old male who presented to the emergency department complaining of abdominal pain that started the night prior to presentation  Prior to the start of his pain, he was in his usual state of health  He noted the pain initially started in his mid upper abdomen and gradually worsened over time with radiation to his back in association with nausea and vomiting  During his ER work-up, he was found to have acute pancreatitis and was admitted to the internal medicine service for management  The patient was admitted to the internal medicine service initially with suspected acute pancreatitis  Patient was subsequently evaluated by general surgery and felt to have acute cholecystitis rather than acute pancreatitis  Operative intervention was discussed with the patient and he agreed to proceed; the patient was transitioned to the surgical service from the internal medicine service at this time  The patient was kept NPO, started on IV fluids and IV antibiotics, and placed on a pain control regimen  The patient was then taken to the operating room for a laparoscopic appendectomy on 6/9/2023  The patient tolerated the procedure well, and there were no intraoperative complications  Postoperatively, the patient was advanced to a regular diet and tolerated it well  Once tolerating an oral diet well, the IV fluids were discontinued  The patient was transitioned to an oral pain medication regimen  He was kept on antibiotics due to the perforated nature of his gallbladder and will be discharged on a home antibiotic regimen  By 6/9/2023, the patient is deemed stable for discharge home      On discharge, the patient is instructed to follow-up with the patient's primary care provider within the next 2 weeks to review the events of the recent hospitalization  The patient is instructed to follow-up with the Acute Care Surgical Services as scheduled on 6/22/2023 at 10:45 AM  The patient is instructed to follow the provided discharge instructions  Condition at Discharge: stable     Discharge instructions/Information to patient and family:   See after visit summary for information provided to patient and family  Provisions for Follow-Up Care:  See after visit summary for information related to follow-up care and any pertinent home health orders  Disposition: See After Visit Summary for discharge disposition information  Planned Readmission: No    Discharge Statement   I spent 30 minutes discharging the patient  This time was spent on the day of discharge  I had direct contact with the patient on the day of discharge  Additional documentation is required if more than 30 minutes were spent on discharge  Discharge Medications:  See after visit summary for reconciled discharge medications provided to patient and family      Luann Gonzalez PA-C  6/9/2023  09:09 AM

## 2023-06-09 NOTE — ASSESSMENT & PLAN NOTE
Lab Results   Component Value Date    HGBA1C 7 0 (H) 04/06/2023       Recent Labs     06/08/23  1553 06/08/23  2046 06/09/23  0735 06/09/23  1028   POCGLU 222* 195* 162* 210*       Blood Sugar Average: Last 72 hrs:  (P) 110 [id number]     · Patient is on metformin, glipizide and Januvia at home  · sliding scale insulin while inpatient  · Added lantus while in hospital as diet has been started  · Add low dose humalog for mealtime coverage while in hospital  · Resume home meds on discharge  · Continue hypoglycemia protocol  · Continue Lyrica at home dosage

## 2023-06-09 NOTE — PROGRESS NOTES
"Progress Note -General surgery  Red Abraham 66 y o  male MRN: [id number]  Unit/Bed#: [facility] 813-01 Encounter: [id number]    Assessment:  66 y o  male with acute cholecystitis s/p lap hemant 6/7    Plan:  - Diet Layo/CHO Controlled; Consistent Carbohydrate Diet Level 2 (5 carb servings/75 grams CHO/meal); Lo Fat  - Pain and Nausea control PRN  - f/u AM labs  - Incentive spirometry  - OOB, ambulate  - cont IV zosyn  - dispo planning, plan to switch to PO abx    Subjective/Objective     Subjective: No acute events overnight  Still not having any pain  Tolerating diet, no n/v  Having bowel movements  Objective:   Vitals: Blood pressure 149/84, pulse 87, temperature 99 4 °F (37 4 °C), resp  rate 20, height 6' 2\" (1 88 m), weight (!) 144 kg (318 lb 5 5 oz), SpO2 96 %  ,Body mass index is 40 87 kg/m²  I/O       06/05 0701 06/06 0700 06/06 0701 06/07 0700    P  O   0    I V  (mL/kg) 900 (6 3)     Total Intake(mL/kg) 900 (6 3) 0 (0)    Urine (mL/kg/hr) 430 1050 (0 5)    Total Output 430 1050    Net +470 -1050                Physical Exam:  General: No acute distress  Neuro: alert and oriented  HEENT: moist mucous membranes  CV: Well perfused, regular rate and rhythm  Lungs: Normal work of breathing, no increased respiratory effort  Abdomen: Soft, non-tender, non-distended  Incisions clean, dry and intact  Extremities: No edema, clubbing or cyanosis  Skin: Warm, dry          Lab, Imaging and other studies:   I have personally reviewed pertinent reports    , CBC with diff:   Lab Results   Component Value Date    HCT 37 1 06/09/2023    HGB 12 0 06/09/2023    MCH 28 0 06/09/2023    MCHC 32 3 06/09/2023    MCV 87 06/09/2023    MPV 11 5 06/09/2023    NRBC 0 06/09/2023     06/09/2023    RBC 4 29 06/09/2023    RDW 13 5 06/09/2023    WBC 18 07 (H) 06/09/2023   , BMP/CMP:   No results found for: \"ALKPHOS\", \"ALT\", \"ANIONGAP\", \"AST\", \"BILITOT\", \"BUN\", \"CALCIUM\", \"CL\", \"CO2\", \"CREATININE\", \"EGFR\", \"GLUCOSE\", \"K\", \"PROT\", " "\"SODIUM\", Magnesium: No components found for: \"MAG\"  VTE Pharmacologic Prophylaxis: Heparin  VTE Mechanical Prophylaxis: sequential compression device        Judit De La O MD  6/9/2023  6:43 AM    "

## 2023-06-09 NOTE — QUICK NOTE
Nurse-Patient-Provider rounds were completed with the patient's nurse today, Sergio Levy  We discussed the plan is to continue current level of care  Though plan was initially for discharge this morning, patient now notifying us that his son and caretaker are away and unable to assist on his transition back to home or help care for him until tomorrow, 6/10/2023  His discharge will be temporarily held with plan to proceed with discharge on 6/10/2023  In the interim, he may keep his current IV site in the event that IV medications are necessary until discharge  Otherwise, he may continue a diet as tolerated and oral analgesics  Continue current antibiotic regimen with plan to transition to oral antibiotics on discharge  We reviewed all of the invasive devices/lines/telemetry orders   - None  DVT Prophylaxis:  - Continue Lovenox  Pain Assessment / Plan:  - Continue current analgesic regimen  Mobility Assessment / Plan:  - Activity as tolerated  Goals / Barriers for discharge:  - Continue current level of care with anticipated discharge tomorrow when caretaker available  - Case management following; case and discharge needs discussed  All questions and concerns were addressed  I spent greater than 20 minutes reviewing the plan with the patient and the nurse, and coordinating care for the day      Natalee Scott PA-C  6/9/2023 09:29 AM

## 2023-06-09 NOTE — DISCHARGE INSTR - AVS FIRST PAGE
Acute Care Surgery Discharge Instructions    Please follow-up as instructed  Activity:  - No lifting greater than 20 pounds or strenuous physical activity or exercise for 2 weeks  - Walking and normal light activities are encouraged  - Normal daily activities including climbing steps are okay  - No driving until no longer using pain medications  Return to work:    - You may return to work in 2 weeks or sooner if you are feeling well enough  Diet:    - You may resume your normal diet  Wound Care:  - May shower daily  No tub baths or swimming until cleared by your surgeon   - Wash incision gently with soap and water and pat dry  - Do not apply any creams or ointments unless instructed to do so by your surgeon   - Dedrick Erickson may apply ice as needed (no longer than 20 minutes at a time) for the first 48 hours  Medications:    - You may resume all of your regular medications after discharge unless otherwise instructed  Please refer to your discharge medication list for further details  - Please complete the entire course of antibiotic therapy, Keflex and Flagyl, as prescribed  - Please take the pain medications as directed  - You are encouraged to use non-narcotic pain medications first and whenever possible  Reserve the use of narcotic pain medication for moderate to severe pain not controlled by non-narcotic medications   - No driving while taking narcotic pain medications  - You may become constipated, especially if taking pain medications  You may take any over the counter stool softeners or laxatives as needed  Examples: Milk of Magnesia, Colace, Senna  Additional Instructions:  - If you have any questions or concerns after discharge please call the office   - Call office or return to ER if fever greater than 101, chills, persistent nausea/vomiting, worsening/uncontrollable pain, and/or increasing redness or purulent/foul smelling drainage from incision(s)

## 2023-06-10 VITALS
DIASTOLIC BLOOD PRESSURE: 87 MMHG | WEIGHT: 315 LBS | TEMPERATURE: 98.3 F | RESPIRATION RATE: 16 BRPM | OXYGEN SATURATION: 99 % | SYSTOLIC BLOOD PRESSURE: 159 MMHG | BODY MASS INDEX: 40.43 KG/M2 | HEIGHT: 74 IN | HEART RATE: 60 BPM

## 2023-06-10 LAB
BASOPHILS # BLD AUTO: 0.05 THOUSANDS/ÂΜL (ref 0–0.1)
BASOPHILS NFR BLD AUTO: 0 % (ref 0–1)
EOSINOPHIL # BLD AUTO: 0.43 THOUSAND/ÂΜL (ref 0–0.61)
EOSINOPHIL NFR BLD AUTO: 4 % (ref 0–6)
ERYTHROCYTE [DISTWIDTH] IN BLOOD BY AUTOMATED COUNT: 13.3 % (ref 11.6–15.1)
GLUCOSE SERPL-MCNC: 133 MG/DL (ref 65–140)
GLUCOSE SERPL-MCNC: 161 MG/DL (ref 65–140)
GLUCOSE SERPL-MCNC: 163 MG/DL (ref 65–140)
HCT VFR BLD AUTO: 42.9 % (ref 36.5–49.3)
HGB BLD-MCNC: 13.9 G/DL (ref 12–17)
IMM GRANULOCYTES # BLD AUTO: 0.04 THOUSAND/UL (ref 0–0.2)
IMM GRANULOCYTES NFR BLD AUTO: 0 % (ref 0–2)
LYMPHOCYTES # BLD AUTO: 3.7 THOUSANDS/ÂΜL (ref 0.6–4.47)
LYMPHOCYTES NFR BLD AUTO: 33 % (ref 14–44)
MCH RBC QN AUTO: 28.5 PG (ref 26.8–34.3)
MCHC RBC AUTO-ENTMCNC: 32.4 G/DL (ref 31.4–37.4)
MCV RBC AUTO: 88 FL (ref 82–98)
MONOCYTES # BLD AUTO: 0.78 THOUSAND/ÂΜL (ref 0.17–1.22)
MONOCYTES NFR BLD AUTO: 7 % (ref 4–12)
NEUTROPHILS # BLD AUTO: 6.4 THOUSANDS/ÂΜL (ref 1.85–7.62)
NEUTS SEG NFR BLD AUTO: 56 % (ref 43–75)
NRBC BLD AUTO-RTO: 0 /100 WBCS
PLATELET # BLD AUTO: 273 THOUSANDS/UL (ref 149–390)
PMV BLD AUTO: 11.1 FL (ref 8.9–12.7)
RBC # BLD AUTO: 4.87 MILLION/UL (ref 3.88–5.62)
WBC # BLD AUTO: 11.4 THOUSAND/UL (ref 4.31–10.16)

## 2023-06-10 PROCEDURE — 99024 POSTOP FOLLOW-UP VISIT: CPT | Performed by: SURGERY

## 2023-06-10 PROCEDURE — 99232 SBSQ HOSP IP/OBS MODERATE 35: CPT | Performed by: INTERNAL MEDICINE

## 2023-06-10 PROCEDURE — 85025 COMPLETE CBC W/AUTO DIFF WBC: CPT

## 2023-06-10 PROCEDURE — 82948 REAGENT STRIP/BLOOD GLUCOSE: CPT

## 2023-06-10 PROCEDURE — NC001 PR NO CHARGE: Performed by: SURGERY

## 2023-06-10 RX ADMIN — PREGABALIN 75 MG: 75 CAPSULE ORAL at 08:39

## 2023-06-10 RX ADMIN — CLOPIDOGREL BISULFATE 75 MG: 75 TABLET ORAL at 08:39

## 2023-06-10 RX ADMIN — INSULIN LISPRO 2 UNITS: 100 INJECTION, SOLUTION INTRAVENOUS; SUBCUTANEOUS at 11:37

## 2023-06-10 RX ADMIN — PIPERACILLIN SODIUM AND TAZOBACTAM SODIUM 3.38 G: 36; 4.5 INJECTION, POWDER, LYOPHILIZED, FOR SOLUTION INTRAVENOUS at 11:30

## 2023-06-10 RX ADMIN — ACETAMINOPHEN 975 MG: 325 TABLET, FILM COATED ORAL at 13:15

## 2023-06-10 RX ADMIN — INSULIN LISPRO 3 UNITS: 100 INJECTION, SOLUTION INTRAVENOUS; SUBCUTANEOUS at 11:37

## 2023-06-10 RX ADMIN — INSULIN LISPRO 2 UNITS: 100 INJECTION, SOLUTION INTRAVENOUS; SUBCUTANEOUS at 17:15

## 2023-06-10 RX ADMIN — INSULIN LISPRO 3 UNITS: 100 INJECTION, SOLUTION INTRAVENOUS; SUBCUTANEOUS at 17:14

## 2023-06-10 RX ADMIN — ASPIRIN 81 MG: 81 TABLET, COATED ORAL at 08:39

## 2023-06-10 RX ADMIN — PIPERACILLIN SODIUM AND TAZOBACTAM SODIUM 3.38 G: 36; 4.5 INJECTION, POWDER, LYOPHILIZED, FOR SOLUTION INTRAVENOUS at 17:09

## 2023-06-10 RX ADMIN — ATORVASTATIN CALCIUM 40 MG: 40 TABLET, FILM COATED ORAL at 08:39

## 2023-06-10 RX ADMIN — HYDROCHLOROTHIAZIDE 25 MG: 25 TABLET ORAL at 08:39

## 2023-06-10 RX ADMIN — LISINOPRIL 20 MG: 20 TABLET ORAL at 08:39

## 2023-06-10 RX ADMIN — AMLODIPINE BESYLATE 5 MG: 5 TABLET ORAL at 08:39

## 2023-06-10 RX ADMIN — INSULIN GLARGINE 10 UNITS: 100 INJECTION, SOLUTION SUBCUTANEOUS at 08:46

## 2023-06-10 RX ADMIN — INSULIN LISPRO 3 UNITS: 100 INJECTION, SOLUTION INTRAVENOUS; SUBCUTANEOUS at 07:50

## 2023-06-10 RX ADMIN — SPIRONOLACTONE 25 MG: 25 TABLET ORAL at 08:40

## 2023-06-10 RX ADMIN — PIPERACILLIN SODIUM AND TAZOBACTAM SODIUM 3.38 G: 36; 4.5 INJECTION, POWDER, LYOPHILIZED, FOR SOLUTION INTRAVENOUS at 06:01

## 2023-06-10 RX ADMIN — ACETAMINOPHEN 975 MG: 325 TABLET, FILM COATED ORAL at 06:01

## 2023-06-10 RX ADMIN — ENOXAPARIN SODIUM 40 MG: 40 INJECTION SUBCUTANEOUS at 08:40

## 2023-06-10 RX ADMIN — PIPERACILLIN SODIUM AND TAZOBACTAM SODIUM 3.38 G: 36; 4.5 INJECTION, POWDER, LYOPHILIZED, FOR SOLUTION INTRAVENOUS at 00:01

## 2023-06-10 RX ADMIN — PREGABALIN 75 MG: 75 CAPSULE ORAL at 17:11

## 2023-06-10 NOTE — PROGRESS NOTES
[facility]  Progress Note  Name: Dominga Ramos  MRN: [id number]  Unit/Bed#: [facility] 514-78 I Date of Admission: 6/5/2023   Date of Service: 6/10/2023  Hospital Day: 5    Assessment/Plan   * Epigastric pain  Assessment & Plan  · Patient presented with epigastric pain, radiating to back and lower abdomen associated with vomiting  · Likely 2/2 acute cholecystitis  · CT  Abdomen pelvis shows Gallbladder luminal distention with mild pericholecystic inflammation  No calcified gallstones  No biliary ductal dilation  Pancreas normal   · Lipase and amylase slightly elevated above normal range  · Lipid profile with normal TG level  · RUQ USG noted  · Status post lap hemant on 6/7  · Antibiotics per primary team   · likely post hemant diarrhea>> d/w primary team  Mx per them  Now improving per patient  · Conservative management with Zofran, Mylanta and pain control  Primary hypertension  Assessment & Plan  · Patient is on Aldactazide 25/25 and Lotrel  5-20 at home  · Improving as compared to before  · continue home anti hypertensive regimen  · Adequate pain control      DM (diabetes mellitus), type 2 [facility])  Assessment & Plan  Lab Results   Component Value Date    HGBA1C 7 0 (H) 04/06/2023       Recent Labs     06/09/23  1621 06/09/23  2052 06/10/23  0706 06/10/23  1055   POCGLU 149* 175* 133 161*       Blood Sugar Average: Last 72 hrs:  (P) 181 [id number]     · Patient is on metformin, glipizide and Januvia at home  · sliding scale insulin while inpatient  · Added lantus while in hospital as diet has been started  · Added low dose humalog for mealtime coverage while in hospital  · Switch back to home meds on discharge  · Continue hypoglycemia protocol  · Continue Lyrica at home dosage  PAD (peripheral artery disease) ([facility] [address] )  Assessment & Plan  · Patient is on aspirin, Plavix and statin at home that he has stopped taking as he ran out of prescription    · Continue aspirin, Plavix and statin    CVA (cerebral vascular accident) [facility])  Assessment & Plan  History noted, patient has stopped taking aspirin and statin at home  With residual left sided weakness  Resumed while in hospital, encouraged on compliance  Hyperlipidemia  Assessment & Plan  · Patient is on atorvastatin at home that he has stopped taking, will resume while inpatient  · He was encouraged on being compliant with his medications  Morbid obesity with BMI of 40 0-44 9, adult ([address] )  Assessment & Plan  · Patient's BMI 40  87  · Encouraged on lifestyle modification including diet changes and regular exercise, per patient he tries to exercise every day but have been noncompliant with diet changes  Obstructive sleep apnea syndrome  Assessment & Plan  CPAP at nighttime  [address] Internal Medicine Consultation Follow Up Progress Note  Patient: Venkatesh Jean 66 y o  male   MRN: [id number]  PCP: RENNY Ventura  [facility] Encounter: [id number]  Date Of Visit: 06/10/23    Primary Service: Kymberly Rubio DO      VTE Pharmacologic Prophylaxis: Enoxaparin (Lovenox) / sequential compression device      Education and Discussions with Family / Patient: plan of care, patient and son at bedside  Time Spent for Care: 30 minutes  More than 50% of total time spent on counseling and coordination of care as described above  Subjective:   No overnight events  Diarrhea is improving  No acute complaints  Planned for discharge home today  Objective:     Vitals:   Temp (24hrs), Av °F (36 7 °C), Min:97 1 °F (36 2 °C), Max:98 5 °F (36 9 °C)    Temp:  [97 1 °F (36 2 °C)-98 5 °F (36 9 °C)] 98 3 °F (36 8 °C)  HR:  [58-64] 60  Resp:  [16] 16  BP: (159-165)/(87-94) 159/87  SpO2:  [93 %-99 %] 99 %  Body mass index is 40 87 kg/m²  Input and Output Summary (last 24 hours):        Intake/Output Summary (Last 24 hours) at 6/10/2023 1325  Last data filed at 6/10/2023 0708  Gross per 24 hour   Intake 600 ml   Output 700 ml   Net -100 ml       Physical Exam:     Physical Exam  Constitutional:       General: He is not in acute distress  Cardiovascular:      Rate and Rhythm: Normal rate and regular rhythm       Heart sounds: Normal heart sounds  No murmur heard  Pulmonary:      Effort: No respiratory distress       Breath sounds: Normal breath sounds  No wheezing or rales  Abdominal:      General: Bowel sounds are normal  There is no distension       Palpations: Abdomen is soft       Tenderness: There is no tenderness  Skin:     General: Skin is warm     Neurological:      Mental Status: He is alert       Comments: Awake alert and communicative  Baseline left sided weakness noted     Additional Data:     Results from last 7 days   Lab Units 06/10/23  0553   EOS PCT % 4   HEMATOCRIT % 42 9   HEMOGLOBIN g/dL 13 9   LYMPHS PCT % 33   MONOS PCT % 7   NEUTROS PCT % 56   PLATELETS Thousands/uL 273   WBC Thousand/uL 11 40*     Results from last 7 days   Lab Units 06/09/23  0512   ALK PHOS U/L 65   ALT U/L 60   AST U/L 36   BUN mg/dL 19   CALCIUM mg/dL 8 1*   CHLORIDE mmol/L 105   CO2 mmol/L 31   CREATININE mg/dL 0 86   POTASSIUM mmol/L 3 7               Last 24 Hours Medication List:   Current Facility-Administered Medications   Medication Dose Route Frequency Provider Last Rate   • acetaminophen  975 mg Oral Q8H [facility] Korina Moon MD     • aluminum-magnesium hydroxide-simethicone  30 mL Oral Q6H PRN Korina Moon MD     • amLODIPine  5 mg Oral Daily Korina Moon MD      And   • lisinopril  20 mg Oral Daily Korina Moon MD     • ammonium lactate   Topical Daily Korina Moon MD     • aspirin  81 mg Oral Daily Korina Moon MD     • atorvastatin  40 mg Oral Daily Korina Moon MD     • clopidogrel  75 mg Oral Daily Korina Moon MD     • docusate sodium  100 mg Oral BID Korina Moon MD     • enoxaparin  40 mg Subcutaneous Daily Korina Moon MD     • spironolactone  25 mg Oral Daily Crystal Esquivel MD      And   • hydrochlorothiazide  25 mg Oral Daily Crystal Esquivel MD     • HYDROmorphone  0 5 mg Intravenous Q4H PRN Crystal Esquivel MD     • insulin glargine  10 Units Subcutaneous QAM Edna Juarez MD     • insulin lispro  1-5 Units Subcutaneous HS Edna Juarez MD     • insulin lispro  2-12 Units Subcutaneous TID With Meals Edna Juarez MD     • insulin lispro  3 Units Subcutaneous TID With Meals Edna Juarez MD     • metoprolol  2 5 mg Intravenous Q6H PRN Crystal Esquivel MD     • nicotine  1 patch Transdermal Daily Crystal Esquivel MD     • ondansetron  4 mg Intravenous Q6H PRN Crystal Esquivel MD     • oxyCODONE  10 mg Oral Q4H PRN Crystal Esquivel MD     • oxyCODONE  5 mg Oral Q4H PRN Crystal Esquivel MD     • piperacillin-tazobactam  3 375 g Intravenous Q6H Nani Cote MD 3 375 g (06/10/23 1130)   • pregabalin  75 mg Oral BID Crystal Esquivel MD     • senna  1 tablet Oral Daily Crystal Esquivel MD          Today, Patient Was Seen By: Edna Juarez MD    ** Please Note: This note has been constructed using a voice recognition system   **

## 2023-06-10 NOTE — ASSESSMENT & PLAN NOTE
· Patient is on aspirin, Plavix and statin at home that he has stopped taking as he ran out of prescription    · Continue aspirin, Plavix and statin

## 2023-06-10 NOTE — ASSESSMENT & PLAN NOTE
· Patient presented with epigastric pain, radiating to back and lower abdomen associated with vomiting  · Likely 2/2 acute cholecystitis  · CT  Abdomen pelvis shows Gallbladder luminal distention with mild pericholecystic inflammation  No calcified gallstones  No biliary ductal dilation  Pancreas normal   · Lipase and amylase slightly elevated above normal range  · Lipid profile with normal TG level  · RUQ USG noted  · Status post lap hemant on 6/7  · Antibiotics per primary team   · likely post hemant diarrhea>> d/w primary team  Mx per them  Now improving per patient  · Conservative management with Zofran, Mylanta and pain control

## 2023-06-10 NOTE — ASSESSMENT & PLAN NOTE
Lab Results   Component Value Date    HGBA1C 7 0 (H) 04/06/2023       Recent Labs     06/09/23  1621 06/09/23  2052 06/10/23  0706 06/10/23  1055   POCGLU 149* 175* 133 161*       Blood Sugar Average: Last 72 hrs:  (P) 294 [id number]     · Patient is on metformin, glipizide and Januvia at home  · sliding scale insulin while inpatient  · Added lantus while in hospital as diet has been started  · Added low dose humalog for mealtime coverage while in hospital  · Switch back to home meds on discharge  · Continue hypoglycemia protocol  · Continue Lyrica at home dosage

## 2023-06-10 NOTE — DISCHARGE SUMMARY
Discharge Summary - General Surgery   Moni Lipoma 66 y o  male MRN: [id number]  Unit/Bed#: [facility] 191-46 Encounter: [id number]    Admission Date: 6/5/2023     Discharge Date: 6/10/2023     Admitting Diagnosis: Acute pancreatitis [K85 90]  Abdominal pain [R10 9]  Nausea and vomiting [R11 2]  N&V (nausea and vomiting) [R11 2]    Discharge Diagnosis:     1  Perforated gallbladder  2   Type 2 diabetes mellitus  3   Hypertension  4   Morbid obesity  5   Obstructive sleep apnea  6  PAD  7   Hyperlipidemia  Attending and Service: Dr Ren Whatley, Acute Care Surgical Services  Consulting Physician(s): Internal medicine    Imaging and Procedures Performed:   Orders Placed This Encounter   Procedures   • ED ECG Documentation Only   • POC Renal US       MRI abdomen w wo contrast and mrcp    Result Date: 6/9/2023  Impression: Simple free fluid in the cholecystectomy bed  Workstation performed: [id number]     US right upper quadrant    Result Date: 6/6/2023  Impression: Cholelithiasis  No gallbladder wall thickening  Sonographic Randee Moles sign is negative  A small amount of pericholecystic fluid is seen, clinical significance is uncertain given presence of small volume ascites, as seen on 6/5/2023  Findings are equivocal for acute cholecystitis  Clinical correlation is recommended  HIDA scan can be performed for further evaluation as clinically warranted No intrahepatic or extrahepatic biliary ductal dilation  Suboptimal visualization of the liver and pancreas, limiting evaluation The study was marked in EPIC for immediate notification  Workstation performed: [id number]     CT abdomen pelvis w contrast    Result Date: 6/6/2023  Impression: 1  Gallbladder luminal distention with mild pericholecystic inflammation  No calcified gallstones  Patient's normal alkaline phosphatase and bilirubin, and leukocytosis is noted  Findings are equivocal for acute cholecystitis   2   Trace ascites in the right upper quadrant and pelvis is of uncertain etiology  3   No bowel obstruction  4   Normal appearance of the pancreas  Workstation performed: [id number]     Laparoscopic cholecystectomy on 6/7/2023  Pathology: Final surgical pathology pending at the time of discharge  Hospital Course: Ana Lester is a 61-year-old male who presented to the emergency department complaining of abdominal pain that started the night prior to presentation  Prior to the start of his pain, he was in his usual state of health  He noted the pain initially started in his mid upper abdomen and gradually worsened over time with radiation to his back in association with nausea and vomiting  During his ER work-up, he was found to have acute pancreatitis and was admitted to the internal medicine service for management  The patient was admitted to the internal medicine service initially with suspected acute pancreatitis  Patient was subsequently evaluated by general surgery and felt to have acute cholecystitis rather than acute pancreatitis  Operative intervention was discussed with the patient and he agreed to proceed; the patient was transitioned to the surgical service from the internal medicine service at this time  The patient was kept NPO, started on IV fluids and IV antibiotics, and placed on a pain control regimen  The patient was then taken to the operating room for a laparoscopic appendectomy on 6/9/2023  The patient tolerated the procedure well, and there were no intraoperative complications  Postoperatively, the patient was advanced to a regular diet and tolerated it well  Once tolerating an oral diet well, the IV fluids were discontinued  The patient was transitioned to an oral pain medication regimen  He was kept on antibiotics due to the perforated nature of his gallbladder and will be discharged on a home antibiotic regimen  By 6/9/2023, the patient is deemed stable for discharge home      On discharge, the patient is instructed to follow-up with the patient's primary care provider within the next 2 weeks to review the events of the recent hospitalization  The patient is instructed to follow-up with the Acute Care Surgical Services as scheduled on 6/22/2023 at 10:45 AM  The patient is instructed to follow the provided discharge instructions  Condition at Discharge: stable     Discharge instructions/Information to patient and family:   See after visit summary for information provided to patient and family  Provisions for Follow-Up Care:  See after visit summary for information related to follow-up care and any pertinent home health orders  Disposition: See After Visit Summary for discharge disposition information  Planned Readmission: No    Discharge Statement   I spent 30 minutes discharging the patient  This time was spent on the day of discharge  I had direct contact with the patient on the day of discharge  Additional documentation is required if more than 30 minutes were spent on discharge  Discharge Medications:  See after visit summary for reconciled discharge medications provided to patient and family      Josiane Puga DO   06/10/23  5:41 PM

## 2023-06-10 NOTE — PROGRESS NOTES
"Progress Note - General Surgery   Jass Juarez 66 y o  male MRN: [id number]  [facility] Encounter: [id number]    Assessment:  66 y o  male with acute cholecystitis s/p lap hemant 6/7    VS: AVSS, room air  UOP: 1 35 L    AM Labs:   WBC 11 4 from 18      Plan:  -diet as tolerated  -pain control   -OOB, ambulate  -pulmonary toilet  -DVT PPx   -disposition planning, likely home today     Subjective/Objective       Subjective: feeling well, minimal pain, reliably having bowel function and tolerating diet  Objective:     Blood pressure 159/87, pulse 60, temperature 98 3 °F (36 8 °C), resp  rate 16, height 6' 2\" (1 88 m), weight (!) 144 kg (318 lb 5 5 oz), SpO2 99 %  ,Body mass index is 40 87 kg/m²  Intake/Output Summary (Last 24 hours) at 6/10/2023 5476  Last data filed at 6/10/2023 0708  Gross per 24 hour   Intake 600 ml   Output 700 ml   Net -100 ml       Invasive Devices     Peripheral Intravenous Line  Duration           Peripheral IV 06/05/23 Distal;Left;Upper;Ventral (anterior) Arm 5 days                Physical Exam:     General: NAD, Obese  HEENT: Normocephalic, atraumatic   Cardiovascular: RRR  Respiratory: No distress, room air  Abdomen: soft, minimally and appropriately tender, incisions c/d/i  Extremities: No c/c/e  Neuro: AAOx3  Skin: warm, dry       Lab, Imaging and other studies:  I have personally reviewed pertinent lab results    , CBC:   Lab Results   Component Value Date    HCT 42 9 06/10/2023    HGB 13 9 06/10/2023    MCH 28 5 06/10/2023    MCHC 32 4 06/10/2023    MCV 88 06/10/2023    MPV 11 1 06/10/2023    NRBC 0 06/10/2023     06/10/2023    RBC 4 87 06/10/2023    RDW 13 3 06/10/2023    WBC 11 40 (H) 06/10/2023     VTE Pharmacologic Prophylaxis: Enoxaparin (Lovenox)  VTE Mechanical Prophylaxis: sequential compression device    "

## 2023-06-13 ENCOUNTER — TELEPHONE (OUTPATIENT)
Dept: SURGERY | Facility: CLINIC | Age: 58
End: 2023-06-13

## 2023-06-13 PROCEDURE — 88304 TISSUE EXAM BY PATHOLOGIST: CPT | Performed by: PATHOLOGY

## 2023-06-13 NOTE — TELEPHONE ENCOUNTER
Patient was d/c 6/10/23 from ED for Epigastric pain  Patient states he was ordered pain meds and antibiotic sent to [facility]  He states medication were being delivered to his home but, never received them  He called the pharmacy and they instructed to him to have them reordered  (Lost in delivery) Please advise  Pharmacy phone number [phone]  Dr Cuellar Daily asked me to contact Dr Dave Pedro, he was in surgery  Dr Dave Pedro told me to have the patient take Tylenol & Motrin but to also contact the Pharmacy to contact us  We have not heard back from the Pharmacy

## 2023-06-15 NOTE — UTILIZATION REVIEW
NOTIFICATION OF ADMISSION DISCHARGE   This is a Notification of Discharge from [facility]  Please be advised that this patient has been discharge from our facility  Below you will find the admission and discharge date and time including the patient’s disposition  UTILIZATION REVIEW CONTACT:  Aric Taylor  Utilization   Network Utilization Review Department  Phone: [phone] x carefully listen to the prompts  All voicemails are confidential   Email: [email or web address]     ADMISSION INFORMATION  PRESENTATION DATE: 6/5/2023  8:16 AM  OBERVATION ADMISSION DATE:   INPATIENT ADMISSION DATE: 6/5/23 10:14 AM   DISCHARGE DATE: 6/10/2023  8:21 PM   DISPOSITION:Home/Self Care    IMPORTANT INFORMATION:  Send all requests for admission clinical reviews, approved or denied determinations and any other requests to dedicated fax number below belonging to the campus where the patient is receiving treatment   List of dedicated fax numbers:  [address] DENIALS (Administrative/Medical Necessity) [phone]   [address] (Maternity/NICU/Pediatrics) [phone]   [facility] [phone]   [place] [address] [phone]   [address] [phone]   [address] [facility] [phone] 90 [facility] [phone]   [facility] 119 [phone]   Sabrina December [phone]   [facility] [phone]   Curtiss Dakins [address] [facility] [phone]

## 2023-06-15 NOTE — TELEPHONE ENCOUNTER
Called patient's pharmacy and they report they delivered the patient's pain medications to his house and left them in the mailbox because the patient was not home at that time, per their policy  However when patient went to retrieve the medications, he reports there were no medications delivered  Pharmacist was able to refill the antibiotics, but not the oxycodone  Called patient and he reports he was given his antibiotics and he continues to take them  He reports he was unable to get his oxycodone, however he is no longer in pain and does not need any further pain medications  Patient appreciative of the phone call and all of his questions were answered to his satisfaction

## 2023-06-22 ENCOUNTER — OFFICE VISIT (OUTPATIENT)
Dept: SURGERY | Facility: CLINIC | Age: 58
End: 2023-06-22

## 2023-06-22 VITALS — HEIGHT: 74 IN | TEMPERATURE: 97.3 F | BODY MASS INDEX: 40.87 KG/M2

## 2023-06-22 DIAGNOSIS — K80.20 CHOLELITHIASIS: Primary | ICD-10-CM

## 2023-06-22 DIAGNOSIS — Z09 POSTOP CHECK: ICD-10-CM

## 2023-06-22 PROCEDURE — 99024 POSTOP FOLLOW-UP VISIT: CPT | Performed by: SURGERY

## 2023-06-22 NOTE — PROGRESS NOTES
Office Visit - General Surgery  Junior Jhaveri MRN: [id number]  Encounter: [id number]    Assessment and Plan  Problem List Items Addressed This Visit    None      Chief Complaint:  Junior Jhaveri is a 66 y o  male who presents for Post-op (P/o lap hemant )    Subjective      Past Medical History:   Diagnosis Date   • Diabetes mellitus ([facility] [address] )    • Hypertension    • Stroke [facility])        Past Surgical History:   Procedure Laterality Date   • CHOLECYSTECTOMY LAPAROSCOPIC N/A 6/7/2023    Procedure: CHOLECYSTECTOMY LAPAROSCOPIC;  Surgeon: David Campbell DO;  Location: [facility]IN [facility];  Service: General       History reviewed  No pertinent family history      Social History     Tobacco Use   • Smoking status: Some Days     Types: Cigars   • Smokeless tobacco: Never   • Tobacco comments:     cigar   Vaping Use   • Vaping Use: Some days   • Substances: THC, CBD, Flavoring   Substance Use Topics   • Alcohol use: Yes     Comment: occ   • Drug use: Never        Medications  Current Outpatient Medications on File Prior to Visit   Medication Sig Dispense Refill   • acetaminophen (TYLENOL) 325 mg tablet Take 2 tablets (650 mg total) by mouth every 4 (four) hours as needed for mild pain  0   • aluminum-magnesium hydroxide-simethicone (MYLANTA) 2799-4320-111 mg/30 mL suspension Take 30 mL by mouth every 6 (six) hours as needed for indigestion or heartburn  0   • amLODIPine-benazepril (LOTREL 5-20) 5-20 MG per capsule TAKE 1 CAPSULE BY ORAL ROUTE ONCE DAILY     • ammonium lactate (LAC-HYDRIN) 12 % cream APPLY TOPICALLY 2 (TWO) TIMES A DAY AS NEEDED FOR DRY SKIN 385 g 3   • ASPIRIN 81 81 MG EC tablet TAKE 1 TABLET (81 MG) BY ORAL ROUTE ONCE DAILY     • atorvastatin (LIPITOR) 40 mg tablet Take 40 mg by mouth daily     • Blood Glucose Monitoring Suppl (ONE TOUCH ULTRA 2) w/Device KIT TEST BLOOD SUGAR LEVELS ONE TIME A DAY     • clopidogrel (PLAVIX) 75 mg tablet Take 75 mg by mouth     • Easy Comfort Pen Needles 31G X 6 MM MISC USE WITH INSULIN  each 0   • glipiZIDE (GLUCOTROL XL) 10 mg 24 hr tablet TAKE 1 TABLET(S) EVERY DAY BY ORAL ROUTE WITH MEALS FOR 90 DAYS  • glipiZIDE (GLUCOTROL) 10 mg tablet Take 10 mg by mouth     • Januvia 100 MG tablet TAKE 1 TABLET(S) EVERY DAY BY ORAL ROUTE WITH MEALS  • ketoconazole (NIZORAL) 2 % cream Apply topically daily 60 g 1   • metFORMIN (GLUCOPHAGE-XR) 500 mg 24 hr tablet TAKE 1 TABLET (500 MG TOTAL) BY MOUTH DAILY WITH BREAKFAST 30 tablet 4   • pregabalin (LYRICA) 75 mg capsule TAKE 1 CAPSULE 2 TIMES PER DAY     • spironolactone-hydrochlorothiazide (ALDACTAZIDE) 25-25 mg per tablet TAKE 1 TABLET(S) EVERY DAY BY ORAL ROUTE IN THE MORNING   • Trulicity 5 41 YG/7 5EG SOPN INJECT 0 5 ML (0 75 MG TOTAL) UNDER THE SKIN ONCE A WEEK 2 mL 0   • docusate sodium (COLACE) 100 mg capsule Take 1 capsule (100 mg total) by mouth 2 (two) times a day for 5 days 10 capsule 0     No current facility-administered medications on file prior to visit         Allergies  No Known Allergies    Review of Systems    Objective  Vitals:    06/22/23 1024   Temp: (!) 97 3 °F (36 3 °C)       Physical Exam

## 2023-06-23 NOTE — PROGRESS NOTES
"GENERAL SURGERY   Chanelle Ochoa   MRN: [id number]   : 1965  Chief Complaint   Patient presents with   • Post-op     P/o lap hemant  Assessment/Plan   Diagnoses and all orders for this visit:    Cholelithiasis         Patient is doing well status post-operative cholecystectomy  Post-operative care restrictions discussed  May return to work when ready  He is progressing nicely  I will see him back on an as needed basis  Subjective   The patient is status post laparoscopic cholecystectomy  The patient is not having any pain        The following portions of the patient's history were reviewed by a provider in this encounter and updated as appropriate:    No text in SmartText           Objective   Physical Exam:  Temp (!) 97 3 °F (36 3 °C) (Temporal)   Ht 6' 2\" (1 88 m)   BMI 40 87 kg/m²   Constitutional: well developed, well nourished, alert and not in acute distress  Abdomen: soft, bowel sounds active, non-tender, no abnormal masses and no hernias noted  Incision(s): healing well, no drainage, no erythema, well approximated  Tenderness at incision site: none  Skin: no jaundice    Current Outpatient Medications   Medication Sig Dispense Refill   • acetaminophen (TYLENOL) 325 mg tablet Take 2 tablets (650 mg total) by mouth every 4 (four) hours as needed for mild pain  0   • aluminum-magnesium hydroxide-simethicone (MYLANTA) 3095-0811-681 mg/30 mL suspension Take 30 mL by mouth every 6 (six) hours as needed for indigestion or heartburn  0   • amLODIPine-benazepril (LOTREL 5-20) 5-20 MG per capsule TAKE 1 CAPSULE BY ORAL ROUTE ONCE DAILY     • ammonium lactate (LAC-HYDRIN) 12 % cream APPLY TOPICALLY 2 (TWO) TIMES A DAY AS NEEDED FOR DRY SKIN 385 g 3   • ASPIRIN 81 81 MG EC tablet TAKE 1 TABLET (81 MG) BY ORAL ROUTE ONCE DAILY     • atorvastatin (LIPITOR) 40 mg tablet Take 40 mg by mouth daily     • Blood Glucose Monitoring Suppl (ONE TOUCH ULTRA 2) w/Device KIT TEST BLOOD SUGAR LEVELS " ONE TIME A DAY     • clopidogrel (PLAVIX) 75 mg tablet Take 75 mg by mouth     • Easy Comfort Pen Needles 31G X 6 MM MISC USE WITH INSULIN  each 0   • glipiZIDE (GLUCOTROL XL) 10 mg 24 hr tablet TAKE 1 TABLET(S) EVERY DAY BY ORAL ROUTE WITH MEALS FOR 90 DAYS  • glipiZIDE (GLUCOTROL) 10 mg tablet Take 10 mg by mouth     • Januvia 100 MG tablet TAKE 1 TABLET(S) EVERY DAY BY ORAL ROUTE WITH MEALS  • ketoconazole (NIZORAL) 2 % cream Apply topically daily 60 g 1   • metFORMIN (GLUCOPHAGE-XR) 500 mg 24 hr tablet TAKE 1 TABLET (500 MG TOTAL) BY MOUTH DAILY WITH BREAKFAST 30 tablet 4   • pregabalin (LYRICA) 75 mg capsule TAKE 1 CAPSULE 2 TIMES PER DAY     • spironolactone-hydrochlorothiazide (ALDACTAZIDE) 25-25 mg per tablet TAKE 1 TABLET(S) EVERY DAY BY ORAL ROUTE IN THE MORNING   • Trulicity 0 57 CE/9 0KW SOPN INJECT 0 5 ML (0 75 MG TOTAL) UNDER THE SKIN ONCE A WEEK 2 mL 0   • docusate sodium (COLACE) 100 mg capsule Take 1 capsule (100 mg total) by mouth 2 (two) times a day for 5 days 10 capsule 0     No current facility-administered medications for this visit  Patient has no known allergies     Past Medical History:   Diagnosis Date   • Diabetes mellitus ([facility] )    • Hypertension    • Stroke ([facility] )        Vickie Werner DO

## 2024-04-03 ENCOUNTER — TELEPHONE (OUTPATIENT)
Age: 59
End: 2024-04-03

## 2024-04-03 NOTE — TELEPHONE ENCOUNTER
04/03/24  Screened by: Jada Kumar    Referring Provider  RICARDO     Pre- Screening:     There is no height or weight on file to calculate BMI.  40.87   Has patient been referred for a routine screening Colonoscopy? yes  Is the patient between 45-75 years old? yes      Previous Colonoscopy no   If yes:    Date:     Facility:     Reason:         Does the patient want to see a Gastroenterologist prior to their procedure OR are they having any GI symptoms? no    Has the patient been hospitalized or had abdominal surgery in the past 6 months? no    Does the patient use supplemental oxygen? no    Does the patient take Coumadin, Lovenox, Plavix, Elliquis, Xarelto, or other blood thinning medication? no    Has the patient had a stroke, cardiac event, or stent placed in the past year? no      If patient is between 45yrs - 49yrs, please advise patient that we will have to confirm benefits & coverage with their insurance company for a routine screening colonoscopy.

## 2024-04-18 ENCOUNTER — TELEPHONE (OUTPATIENT)
Age: 59
End: 2024-04-18

## 2024-04-18 ENCOUNTER — PREP FOR PROCEDURE (OUTPATIENT)
Age: 59
End: 2024-04-18

## 2024-04-18 DIAGNOSIS — Z12.11 SCREENING FOR COLON CANCER: Primary | ICD-10-CM

## 2024-04-18 NOTE — TELEPHONE ENCOUNTER
Scheduled date of colonoscopy (as of today): 7/2//2024  Physician performing colonoscopy: Dr. Bates  Location of colonoscopy: [facility]  Bowel prep reviewed with patient: Ynes  Instructions reviewed with patient by: Kylee PERES pt requested for instructions  Clearances: N/A

## 2024-04-18 NOTE — TELEPHONE ENCOUNTER
Patients GI provider:      Number to return call: [phone]    Reason for call: Pt requesting his yulisa/dul prep/diabetic instructions be mailed to him at his verified address: [address] [place] [id number].    Scheduled procedure/appointment date if applicable: Procedure 7/2/24

## 2024-07-01 ENCOUNTER — TELEPHONE (OUTPATIENT)
Age: 59
End: 2024-07-01

## 2024-07-01 NOTE — TELEPHONE ENCOUNTER
Rescheduled date of colonoscopy (as of today):  10/08/2024  Physician performing colonoscopy: DR ARTEAGA  Location of colonoscopy: [facility]  Bowel prep reviewed with patient: MIRALAX/DULCOLAX  Instructions reviewed with patient by: Previously reviewed with pt. Patient requested for directions to be sent to   [email or web address]       Clearances: PLAVIX, per pt, pt hasn't taken in a couple yrs  TRULICITY, pt aware to hold 7 days  Diabetic  metformin, per pt, does not take  glipizide  lantus  januvia, per pt, does not take

## 2024-09-04 ENCOUNTER — OFFICE VISIT (OUTPATIENT)
Dept: PODIATRY | Facility: CLINIC | Age: 59
End: 2024-09-04
Payer: COMMERCIAL

## 2024-09-04 DIAGNOSIS — E11.59 CONTROLLED TYPE 2 DIABETES MELLITUS WITH OTHER CIRCULATORY COMPLICATION, WITHOUT LONG-TERM CURRENT USE OF INSULIN (HCC): Primary | ICD-10-CM

## 2024-09-04 DIAGNOSIS — B35.1 ONYCHOMYCOSIS: ICD-10-CM

## 2024-09-04 DIAGNOSIS — R60.1 GENERALIZED EDEMA: ICD-10-CM

## 2024-09-04 PROCEDURE — 99212 OFFICE O/P EST SF 10 MIN: CPT | Performed by: PODIATRIST

## 2024-09-04 NOTE — PROGRESS NOTES
Ambulatory Visit  Name: Nas Salgado      : 1965      MRN: [id number]  Encounter Provider: Osiel Albarado DPM  Encounter Date: 2024   Encounter department: [facility] [place]    Assessment & Plan   1. Controlled type 2 diabetes mellitus with other circulatory complication, without long-term current use of insulin ([facility])  2. Onychomycosis  3. Generalized edema    Debride mycotic nails and thin the nail plates on both feet with the use of a nail nipper manually and an electric Dremel bur was used to smooth and thinned out the nail plates.  Patient is to continue applying lotion to both legs and feet to keep the elasticity in them and keep the dryness out.  He is also try and elevate the legs to help with the edema that is present.     Discussed diabetic shoes and where to purchase a second pair he was told either online with 2 companies named [employer] and Dr. Oliver or through  which is locally and he just has to give them a call since he got a pair already this year    Return in about 11 weeks (around 2024).     History of Present Illness     Nas Salgado is a 59 y.o. male who presents with chief complaint of painful thick nails on both feet.  He is a diabetic whose last A1c according to him was 6.0.    Review of Systems  Medical History Reviewed by provider this encounter:       Current Outpatient Medications on File Prior to Visit   Medication Sig Dispense Refill    acetaminophen (TYLENOL) 325 mg tablet Take 2 tablets (650 mg total) by mouth every 4 (four) hours as needed for mild pain  0    aluminum-magnesium hydroxide-simethicone (MYLANTA) 6568-4749-084 mg/30 mL suspension Take 30 mL by mouth every 6 (six) hours as needed for indigestion or heartburn  0    amLODIPine-benazepril (LOTREL 5-20) 5-20 MG per capsule TAKE 1 CAPSULE BY ORAL ROUTE ONCE DAILY      ammonium lactate (LAC-HYDRIN) 12 % cream APPLY TOPICALLY 2 (TWO) TIMES A DAY AS NEEDED FOR DRY SKIN 385 g 3     ASPIRIN 81 81 MG EC tablet TAKE 1 TABLET (81 MG) BY ORAL ROUTE ONCE DAILY      atorvastatin (LIPITOR) 40 mg tablet Take 40 mg by mouth daily      Blood Glucose Monitoring Suppl (ONE TOUCH ULTRA 2) w/Device KIT TEST BLOOD SUGAR LEVELS ONE TIME A DAY      clopidogrel (PLAVIX) 75 mg tablet Take 75 mg by mouth      docusate sodium (COLACE) 100 mg capsule Take 1 capsule (100 mg total) by mouth 2 (two) times a day for 5 days 10 capsule 0    Easy Comfort Pen Needles 31G X 6 MM MISC USE WITH INSULIN  each 0    glipiZIDE (GLUCOTROL XL) 10 mg 24 hr tablet TAKE 1 TABLET(S) EVERY DAY BY ORAL ROUTE WITH MEALS FOR 90 DAYS.      glipiZIDE (GLUCOTROL) 10 mg tablet Take 10 mg by mouth      Januvia 100 MG tablet TAKE 1 TABLET(S) EVERY DAY BY ORAL ROUTE WITH MEALS.      ketoconazole (NIZORAL) 2 % cream Apply topically daily 60 g 1    metFORMIN (GLUCOPHAGE-XR) 500 mg 24 hr tablet TAKE 1 TABLET (500 MG TOTAL) BY MOUTH DAILY WITH BREAKFAST 30 tablet 4    pregabalin (LYRICA) 75 mg capsule TAKE 1 CAPSULE 2 TIMES PER DAY      spironolactone-hydrochlorothiazide (ALDACTAZIDE) 25-25 mg per tablet TAKE 1 TABLET(S) EVERY DAY BY ORAL ROUTE IN THE MORNING .      Trulicity 0.75 MG/0.5ML SOPN INJECT 0.5 ML (0.75 MG TOTAL) UNDER THE SKIN ONCE A WEEK 2 mL 0     No current facility-administered medications on file prior to visit.      Objective     There were no vitals taken for this visit.    Physical Exam  Feet:      Right foot:      Protective Sensation: 4 sites tested.  2 sites sensed.      Left foot:      Protective Sensation: 4 sites tested.  2 sites sensed.     Vascular status is 0/4 DP PT negative digital hair delayed capillary refill with pitting edema present bilaterally.  The capillary refill of the is greater than 3 seconds and the edema is +2 and pitting    Derm nails are brittle elongated hypertrophic yellow-brown discoloration with subungual debris.  There is an increased thickness in the nails of approximately 2 to 4 mm with  the hallux nails being the worst.  The skin has good moisture content to it no noticeable flaky tissue present at today's visit    Ortho hammertoe deformities are present on the fourth and fifth digits bilaterally and also pes planus deformity is noted bilaterally.    Neuro light touch was intact the 0.5 monofilament test that was performed was decreased in the areas of the hallux plantar aspect and plantar aspect of the third fourth toes it was slightly felt in the arch and submet 3 area    Administrative Statements   I have spent a total time of 15 minutes in caring for this patient on the day of the visit/encounter including Patient and family education, Counseling / Coordination of care, Documenting in the medical record, Reviewing / ordering tests, medicine, procedures  , and Obtaining or reviewing history  .

## 2024-10-07 ENCOUNTER — TELEPHONE (OUTPATIENT)
Age: 59
End: 2024-10-07

## 2024-11-08 ENCOUNTER — TELEPHONE (OUTPATIENT)
Age: 59
End: 2024-11-08

## 2024-11-08 NOTE — TELEPHONE ENCOUNTER
Pt is calling to get appt timeframe. Pt was advised appt is 11/12 at 2pm and its an hour slot. Pt needed to make sure he was able to setup his transportation to & from the appt.

## 2024-11-12 ENCOUNTER — OFFICE VISIT (OUTPATIENT)
Dept: NEUROLOGY | Facility: CLINIC | Age: 59
End: 2024-11-12
Payer: COMMERCIAL

## 2024-11-12 VITALS
TEMPERATURE: 97.8 F | HEART RATE: 77 BPM | OXYGEN SATURATION: 97 % | DIASTOLIC BLOOD PRESSURE: 78 MMHG | SYSTOLIC BLOOD PRESSURE: 132 MMHG

## 2024-11-12 DIAGNOSIS — Z86.73 HISTORY OF STROKE: Primary | ICD-10-CM

## 2024-11-12 DIAGNOSIS — E78.5 HYPERLIPIDEMIA: ICD-10-CM

## 2024-11-12 DIAGNOSIS — I65.21 STENOSIS OF RIGHT CAROTID ARTERY: ICD-10-CM

## 2024-11-12 PROCEDURE — 99205 OFFICE O/P NEW HI 60 MIN: CPT | Performed by: PHYSICIAN ASSISTANT

## 2024-11-12 NOTE — ASSESSMENT & PLAN NOTE
Pt is due for a carotid ultrasound.  He has a known occlusion of the Rt ICA.  Orders:    VAS carotid complete study; Future

## 2024-11-12 NOTE — PROGRESS NOTES
Ambulatory Visit  Name: Nas Salgado      : 1965      MRN: [id number]  Encounter Provider: Sheryl Lora PA-C  Encounter Date: 2024   Encounter department: [facility] [place]    Assessment & Plan  History of stroke  Pt denies any symptoms to suggest new stroke.  Pt should continue Aspirin, Plavix and atorvastatin for secondary stroke prevention.  Continue with good blood pressure control; I would recommend monitoring at home at least 3 times per week; Goal of <130/80; at goal  Continue with good cholesterol control; Goal LDL <70; Above goal but due for recheck.  Continue with good blood sugar control; Goal HgbA1c <7.0; at goal  Pt was encouraged to get regular exercise and follow a Mediterranean diet.  If pt has any new stroke-like symptoms including sudden painless loss of vision or double vision, difficulty speaking or swallowing, vertigo / room spinning that does not quickly resolve, or weakness / numbness affecting 1 side of the face or body he should proceed by ambulance to the nearest emergency room immediately.  Pt will follow-up in 6 months.         Stenosis of right carotid artery  Pt is due for a carotid ultrasound.  He has a known occlusion of the Rt ICA.  Orders:    VAS carotid complete study; Future    Hyperlipidemia    Orders:    Lipid panel; Future        History of Present Illness     Nas Salgado is a 58yo RH male, with HTN, DM type 2, who presents today to establish care with Neurology due to a history of stroke. He has had multiple strokes. Per pt strokes occurred in 2010, 2011, and 2012. His last stroke was in  due to Rt internal carotid artery occlusion. He was living in [place] when he had the first few strokes. He then moved to [place] with family for 6 years. He is now in [place] with his son who helps the pt with daily activities and drives him where he needs to go. He was attending therapy through [facility].  He completed therapy about 1 week ago. He is now following a home exercise program and goes to the gym. He attends a day program when his son is at work. For mobility, he has a motorized WC that he uses in public and a small and large based quad cane in the house. He is taking ASA, Plavix, and atorvastatin. He has been taking these medications for years. HA1C is at goal. LDL is above goal but he is due for recheck. He denies any symptoms to suggest new stroke.    HA1C 6.4  Lipid panel: . LDL 90.    Review of Systems   Constitutional:  Negative for appetite change, fatigue and fever.   HENT: Negative.  Negative for hearing loss, tinnitus, trouble swallowing and voice change.    Eyes: Negative.  Negative for photophobia, pain and visual disturbance.   Respiratory: Negative.  Negative for shortness of breath.    Cardiovascular: Negative.  Negative for palpitations.   Gastrointestinal: Negative.  Negative for nausea and vomiting.   Endocrine: Negative.  Negative for cold intolerance.   Genitourinary: Negative.  Negative for dysuria, frequency and urgency.   Musculoskeletal:  Positive for gait problem. Negative for back pain, myalgias, neck pain and neck stiffness.   Skin: Negative.  Negative for rash.   Allergic/Immunologic: Negative.    Neurological:  Positive for weakness (left side weakness). Negative for dizziness, tremors, seizures, syncope, facial asymmetry, speech difficulty, light-headedness, numbness and headaches.   Hematological: Negative.  Does not bruise/bleed easily.   Psychiatric/Behavioral: Negative.  Negative for confusion, hallucinations and sleep disturbance.    All other systems reviewed and are negative.    I have personally reviewed the MA's review of systems and made changes as necessary.    Medical History Reviewed by provider this encounter:  Tobacco  Allergies  Meds  Problems  Med Hx  Surg Hx  Fam Hx     .  Objective     /78 (BP Location: Right arm, Patient Position: Sitting,  Cuff Size: Adult)   Pulse 77   Temp 97.8 °F (36.6 °C) (Temporal)   SpO2 97%     Physical Exam  Vitals reviewed.   Constitutional:       Appearance: Normal appearance.   HENT:      Head: Normocephalic and atraumatic.      Nose: Nose normal.      Mouth/Throat:      Mouth: Mucous membranes are moist.   Eyes:      Extraocular Movements: Extraocular movements intact and EOM normal.      Conjunctiva/sclera: Conjunctivae normal.      Pupils: Pupils are equal, round, and reactive to light.   Pulmonary:      Effort: Pulmonary effort is normal.   Neurological:      Mental Status: He is alert and oriented to person, place, and time.      Deep Tendon Reflexes:      Reflex Scores:       Tricep reflexes are 3+ on the left side.       Bicep reflexes are 3+ on the left side.       Brachioradialis reflexes are 3+ on the left side.       Patellar reflexes are 3+ on the left side.  Psychiatric:         Speech: Speech normal.       Neurologic Exam     Mental Status   Oriented to person, place, and time.   Attention: normal. Concentration: normal.   Speech: speech is normal   Level of consciousness: alert  Normal comprehension.     Cranial Nerves     CN III, IV, VI   Pupils are equal, round, and reactive to light.  Extraocular motions are normal.   Right pupil: Shape: regular. Reactivity: brisk. Consensual response: intact. Accommodation: intact.   Left pupil: Shape: regular. Reactivity: brisk. Consensual response: intact. Accommodation: intact.   CN III: no CN III palsy  CN VI: no CN VI palsy  Nystagmus: none   Ophthalmoparesis: none  Upgaze: normal  Downgaze: normal  Conjugate gaze: present    CN V   Facial sensation intact.     CN VII   Facial expression full, symmetric.     CN IX, X   CN IX normal.     CN XI   CN XI normal.     CN XII   CN XII normal.     Motor Exam     Strength   Left deltoid: 4/5  Left biceps: 4/5  Left triceps: 4/5    Gait, Coordination, and Reflexes     Reflexes   Reflexes 2+ except as noted.   Left  brachioradialis: 3+  Left biceps: 3+  Left triceps: 3+  Left patellar: 3+In a power wheelchair.       Results/Data:  I have reviewed the results and images from the pt's chart in detail with the patient.    SL AMB Radiology Results Review: I have reviewed radiology reports from Care Everywhere including: CT head and MRI brain.    Administrative Statements   I have spent a total time of 60 minutes in caring for this patient on the day of the visit/encounter including Diagnostic results, Prognosis, Risks and benefits of tx options, Instructions for management, Patient and family education, Importance of tx compliance, Risk factor reductions, Impressions, Counseling / Coordination of care, Documenting in the medical record, Reviewing / ordering tests, medicine, procedures  , and Obtaining or reviewing history  .

## 2024-11-12 NOTE — PATIENT INSTRUCTIONS
Hyperlipidemia    Orders:    Lipid panel; Future      Carotid stenosis  Pt is due for a carotid ultrasound.  He has a known occlusion of the Rt ICA.  Orders:    VAS carotid complete study; Future      History of stroke  Pt denies any symptoms to suggest new stroke.  Pt should continue Aspirin, Plavix and atorvastatin for secondary stroke prevention.  Continue with good blood pressure control; I would recommend monitoring at home at least 3 times per week; Goal of <130/80; at goal  Continue with good cholesterol control; Goal LDL <70; Above goal but due for recheck.  Continue with good blood sugar control; Goal HgbA1c <7.0; at goal  Pt was encouraged to get regular exercise and follow a Mediterranean diet.  If pt has any new stroke-like symptoms including sudden painless loss of vision or double vision, difficulty speaking or swallowing, vertigo / room spinning that does not quickly resolve, or weakness / numbness affecting 1 side of the face or body he should proceed by ambulance to the nearest emergency room immediately.  Pt will follow-up in 6 months.

## 2024-11-12 NOTE — ASSESSMENT & PLAN NOTE
Pt denies any symptoms to suggest new stroke.  Pt should continue Aspirin, Plavix and atorvastatin for secondary stroke prevention.  Continue with good blood pressure control; I would recommend monitoring at home at least 3 times per week; Goal of <130/80; at goal  Continue with good cholesterol control; Goal LDL <70; Above goal but due for recheck.  Continue with good blood sugar control; Goal HgbA1c <7.0; at goal  Pt was encouraged to get regular exercise and follow a Mediterranean diet.  If pt has any new stroke-like symptoms including sudden painless loss of vision or double vision, difficulty speaking or swallowing, vertigo / room spinning that does not quickly resolve, or weakness / numbness affecting 1 side of the face or body he should proceed by ambulance to the nearest emergency room immediately.  Pt will follow-up in 6 months.

## 2024-12-18 ENCOUNTER — HOSPITAL ENCOUNTER (OUTPATIENT)
Dept: NON INVASIVE DIAGNOSTICS | Facility: HOSPITAL | Age: 59
Discharge: HOME | End: 2024-12-18
Payer: COMMERCIAL

## 2024-12-18 DIAGNOSIS — I65.21 STENOSIS OF RIGHT CAROTID ARTERY: ICD-10-CM

## 2024-12-18 PROCEDURE — 93880 EXTRACRANIAL BILAT STUDY: CPT | Performed by: STUDENT IN AN ORGANIZED HEALTH CARE EDUCATION/TRAINING PROGRAM

## 2024-12-18 PROCEDURE — 93880 EXTRACRANIAL BILAT STUDY: CPT

## 2025-01-09 ENCOUNTER — TELEPHONE (OUTPATIENT)
Dept: GASTROENTEROLOGY | Facility: HOSPITAL | Age: 60
End: 2025-01-09

## 2025-01-09 NOTE — TELEPHONE ENCOUNTER
Patient contacted office to verify arrival time for procedure tomorrow.  Patient aware of 7:30 arrival time.

## 2025-01-10 ENCOUNTER — HOSPITAL ENCOUNTER (OUTPATIENT)
Dept: GASTROENTEROLOGY | Facility: HOSPITAL | Age: 60
Setting detail: OUTPATIENT SURGERY
Discharge: HOME | End: 2025-01-10
Attending: INTERNAL MEDICINE
Payer: COMMERCIAL

## 2025-01-10 ENCOUNTER — ANESTHESIA EVENT (OUTPATIENT)
Dept: GASTROENTEROLOGY | Facility: HOSPITAL | Age: 60
End: 2025-01-10
Payer: COMMERCIAL

## 2025-01-10 ENCOUNTER — ANESTHESIA (OUTPATIENT)
Dept: GASTROENTEROLOGY | Facility: HOSPITAL | Age: 60
End: 2025-01-10
Payer: COMMERCIAL

## 2025-01-10 VITALS
DIASTOLIC BLOOD PRESSURE: 70 MMHG | OXYGEN SATURATION: 99 % | TEMPERATURE: 96.3 F | BODY MASS INDEX: 42.37 KG/M2 | HEART RATE: 71 BPM | RESPIRATION RATE: 20 BRPM | SYSTOLIC BLOOD PRESSURE: 142 MMHG | WEIGHT: 315 LBS

## 2025-01-10 DIAGNOSIS — Z12.11 SCREENING FOR COLON CANCER: ICD-10-CM

## 2025-01-10 LAB — GLUCOSE SERPL-MCNC: 119 MG/DL (ref 65–140)

## 2025-01-10 PROCEDURE — 82948 REAGENT STRIP/BLOOD GLUCOSE: CPT

## 2025-01-10 PROCEDURE — G0121 COLON CA SCRN NOT HI RSK IND: HCPCS | Performed by: INTERNAL MEDICINE

## 2025-01-10 RX ORDER — SODIUM CHLORIDE 9 MG/ML
INJECTION, SOLUTION INTRAVENOUS CONTINUOUS PRN
Status: DISCONTINUED | OUTPATIENT
Start: 2025-01-10 | End: 2025-01-10

## 2025-01-10 RX ORDER — PROPOFOL 10 MG/ML
INJECTION, EMULSION INTRAVENOUS AS NEEDED
Status: DISCONTINUED | OUTPATIENT
Start: 2025-01-10 | End: 2025-01-10

## 2025-01-10 RX ORDER — PROPOFOL 10 MG/ML
INJECTION, EMULSION INTRAVENOUS CONTINUOUS PRN
Status: DISCONTINUED | OUTPATIENT
Start: 2025-01-10 | End: 2025-01-10

## 2025-01-10 RX ORDER — LIDOCAINE HYDROCHLORIDE 10 MG/ML
INJECTION, SOLUTION EPIDURAL; INFILTRATION; INTRACAUDAL; PERINEURAL AS NEEDED
Status: DISCONTINUED | OUTPATIENT
Start: 2025-01-10 | End: 2025-01-10

## 2025-01-10 RX ADMIN — PROPOFOL 70 MG: 10 INJECTION, EMULSION INTRAVENOUS at 08:47

## 2025-01-10 RX ADMIN — LIDOCAINE HYDROCHLORIDE 50 MG: 10 INJECTION, SOLUTION EPIDURAL; INFILTRATION; INTRACAUDAL; PERINEURAL at 08:46

## 2025-01-10 RX ADMIN — PROPOFOL 100 MCG/KG/MIN: 10 INJECTION, EMULSION INTRAVENOUS at 08:48

## 2025-01-10 RX ADMIN — PROPOFOL 30 MG: 10 INJECTION, EMULSION INTRAVENOUS at 09:14

## 2025-01-10 RX ADMIN — SODIUM CHLORIDE: 0.9 INJECTION, SOLUTION INTRAVENOUS at 08:41

## 2025-01-10 NOTE — ANESTHESIA POSTPROCEDURE EVALUATION
Post-Op Assessment Note    CV Status:  Stable  Pain Score: 0    Pain management: adequate       Mental Status:  Sleepy   Hydration Status:  Euvolemic   PONV Controlled:  Controlled   Airway Patency:  Patent     Post Op Vitals Reviewed: Yes    No anethesia notable event occurred.    Staff: CRNA       Last Filed PACU Vitals:  Vitals Value Taken Time   Temp 96.3 °F (35.7 °C) 01/10/25 0924   Pulse 71 01/10/25 0924   /63 01/10/25 0924   Resp 20 01/10/25 0924   SpO2 99 % 01/10/25 0924       Modified Rick:     Vitals Value Taken Time   Activity 2 01/10/25 0925   Respiration 2 01/10/25 0925   Circulation 2 01/10/25 0925   Consciousness 1 01/10/25 0925   Oxygen Saturation 2 01/10/25 0925     Modified Rick Score: 9

## 2025-01-10 NOTE — ANESTHESIA PREPROCEDURE EVALUATION
Procedure:  COLONOSCOPY    Relevant Problems   CARDIO   (+) Carotid stenosis   (+) Hyperlipidemia   (+) PAD (peripheral artery disease) (HCC)   (+) Primary hypertension      ENDO   (+) DM (diabetes mellitus), type 2 (HCC)      GI/HEPATIC   (+) Acute pancreatitis      NEURO/PSYCH   (+) CVA (cerebral vascular accident) (HCC)   (+) Weakness of extremity      PULMONARY   (+) Obstructive sleep apnea syndrome   Left side weakness       Sinus rhythm with frequent Premature ventricular complexes  Nonspecific ST abnormality  Abnormal ECG    Physical Exam    Airway    Mallampati score: III  TM Distance: >3 FB  Neck ROM: full     Dental       Cardiovascular  Rhythm: regular, No weak pulses    Pulmonary   No stridor    Other Findings        Anesthesia Plan  ASA Score- 3     Anesthesia Type- IV sedation with anesthesia with ASA Monitors.         Additional Monitors:     Airway Plan:            Plan Factors-    Chart reviewed.   Existing labs reviewed. Patient summary reviewed.                  Induction- intravenous.    Postoperative Plan-         Informed Consent- Anesthetic plan and risks discussed with patient.  I personally reviewed this patient with the CRNA. Discussed and agreed on the Anesthesia Plan with the CRNA..

## 2025-01-10 NOTE — H&P
History and Physical -  [facility]  Nas Salgado 59 y.o. male MRN: [id number]                  HPI: Nas Salgado is a 59 y.o. year old male who presents for colonoscopy. None prior.       REVIEW OF SYSTEMS: Per the HPI, and otherwise unremarkable.    Historical Information   Past Medical History:   Diagnosis Date    Diabetes mellitus (HCC)     Hypertension     Stroke (HCC)      Past Surgical History:   Procedure Laterality Date    CHOLECYSTECTOMY LAPAROSCOPIC N/A 6/7/2023    Procedure: CHOLECYSTECTOMY LAPAROSCOPIC;  Surgeon: Adrian Lan DO;  Location: [facility];  Service: General     Social History   Social History     Substance and Sexual Activity   Alcohol Use Yes    Comment: occ     Social History     Substance and Sexual Activity   Drug Use Never     Social History     Tobacco Use   Smoking Status Some Days    Types: Cigars   Smokeless Tobacco Never   Tobacco Comments    cigar     No family history on file.    Meds/Allergies       Current Outpatient Medications:     acetaminophen (TYLENOL) 325 mg tablet    aluminum-magnesium hydroxide-simethicone (MYLANTA) 5289-7447-583 mg/30 mL suspension    amLODIPine-benazepril (LOTREL 5-20) 5-20 MG per capsule    ammonium lactate (LAC-HYDRIN) 12 % cream    ASPIRIN 81 81 MG EC tablet    atorvastatin (LIPITOR) 40 mg tablet    Blood Glucose Monitoring Suppl (ONE TOUCH ULTRA 2) w/Device KIT    clopidogrel (PLAVIX) 75 mg tablet    docusate sodium (COLACE) 100 mg capsule    Easy Comfort Pen Needles 31G X 6 MM MISC    glipiZIDE (GLUCOTROL XL) 10 mg 24 hr tablet    glipiZIDE (GLUCOTROL) 10 mg tablet    Januvia 100 MG tablet    ketoconazole (NIZORAL) 2 % cream    metFORMIN (GLUCOPHAGE-XR) 500 mg 24 hr tablet    pregabalin (LYRICA) 75 mg capsule    spironolactone-hydrochlorothiazide (ALDACTAZIDE) 25-25 mg per tablet    Trulicity 0.75 MG/0.5ML SOPN    No Known Allergies    Objective     There were no vitals taken for this visit.      PHYSICAL  EXAM    Gen: NAD  Head: NCAT  CV: RRR  CHEST: Clear  ABD: soft, NT/ND  EXT: no edema      ASSESSMENT/PLAN:  This is a 59 y.o. year old male here for colonoscopy, and he is stable and optimized for his procedure.

## 2025-02-05 ENCOUNTER — OFFICE VISIT (OUTPATIENT)
Dept: PODIATRY | Facility: CLINIC | Age: 60
End: 2025-02-05
Payer: COMMERCIAL

## 2025-02-05 VITALS — HEIGHT: 74 IN | BODY MASS INDEX: 40.43 KG/M2 | WEIGHT: 315 LBS

## 2025-02-05 DIAGNOSIS — E11.59 CONTROLLED TYPE 2 DIABETES MELLITUS WITH OTHER CIRCULATORY COMPLICATION, WITHOUT LONG-TERM CURRENT USE OF INSULIN (HCC): Primary | ICD-10-CM

## 2025-02-05 DIAGNOSIS — I73.9 PAD (PERIPHERAL ARTERY DISEASE) (HCC): ICD-10-CM

## 2025-02-05 DIAGNOSIS — R60.1 GENERALIZED EDEMA: ICD-10-CM

## 2025-02-05 DIAGNOSIS — B35.1 ONYCHOMYCOSIS: ICD-10-CM

## 2025-02-05 DIAGNOSIS — G62.9 NEUROPATHY: ICD-10-CM

## 2025-02-05 PROCEDURE — RECHECK: Performed by: PODIATRIST

## 2025-02-05 PROCEDURE — 11721 DEBRIDE NAIL 6 OR MORE: CPT | Performed by: PODIATRIST

## 2025-02-05 NOTE — PROGRESS NOTES
Name: Nas Salgado      : 1965      MRN: [id number]  Encounter Provider: Osiel Albarado DPM  Encounter Date: 2025   Encounter department: [facility] [place]  :  Assessment & Plan  Controlled type 2 diabetes mellitus with other circulatory complication, without long-term current use of insulin ([facility])    Lab Results   Component Value Date    HGBA1C 6.4 (H) 2023            PAD (peripheral artery disease) ([facility])         Generalized edema         Onychomycosis       Debride mycotic nails and thin the nail plates x 10 with the use of a nail nipper manually and an electric Dremel bur was used to reduce the thickness of the nail beds and smoothed the distal aspect of the nails.   Neuropathy         Pt was instructed to use lotion once a day on both feet such as cerave, Cetaphil or similar thick style of lotion.     Discussed proper shoe gear, daily inspections of feet, and general foot health with patient. Patient has Q8  findings and is recommended for at risk foot care every 9-10 weeks.    Patients most recent complete clinical foot exam was on: 2024      Return in about 10 weeks (around 2025).     History of Present Illness   HPI  Nas Salgado is a 59 y.o. male who presents chief complaint of painful thick nails on both feet.  He is a diabetic who sugar is fair.  He also has peripheral neuropathy and he states that the other day he had pins-and-needles and tingling only in his right foot he took his Lyrica as directed and was still experiencing the pins-and-needles so about an hour later he took another single dose of Lyrica.  Patient gets around with his motorized wheelchair.Patient presents for at-risk foot care.  Patient has no acute concerns today.  Patient has significant lower extremity risk due to neuropathy, parasthesia, edema, and trophic skin changes to the lower extremity.   History obtained from: patient    Review of Systems  Medical History Reviewed by  "provider this encounter:     .  Current Outpatient Medications on File Prior to Visit   Medication Sig Dispense Refill    acetaminophen (TYLENOL) 325 mg tablet Take 2 tablets (650 mg total) by mouth every 4 (four) hours as needed for mild pain  0    amLODIPine-benazepril (LOTREL 5-20) 5-20 MG per capsule TAKE 1 CAPSULE BY ORAL ROUTE ONCE DAILY      ammonium lactate (LAC-HYDRIN) 12 % cream APPLY TOPICALLY 2 (TWO) TIMES A DAY AS NEEDED FOR DRY SKIN 385 g 3    atorvastatin (LIPITOR) 40 mg tablet Take 40 mg by mouth daily      Blood Glucose Monitoring Suppl (ONE TOUCH ULTRA 2) w/Device KIT TEST BLOOD SUGAR LEVELS ONE TIME A DAY      docusate sodium (COLACE) 100 mg capsule Take 1 capsule (100 mg total) by mouth 2 (two) times a day for 5 days 10 capsule 0    Easy Comfort Pen Needles 31G X 6 MM MISC USE WITH INSULIN  each 0    ketoconazole (NIZORAL) 2 % cream Apply topically daily (Patient not taking: Reported on 11/12/2024) 60 g 1    pregabalin (LYRICA) 75 mg capsule TAKE 1 CAPSULE 2 TIMES PER DAY      Trulicity 0.75 MG/0.5ML SOPN INJECT 0.5 ML (0.75 MG TOTAL) UNDER THE SKIN ONCE A WEEK 2 mL 0     No current facility-administered medications on file prior to visit.      Social History     Tobacco Use    Smoking status: Some Days     Types: Cigars    Smokeless tobacco: Never    Tobacco comments:     cigar   Vaping Use    Vaping status: Some Days    Substances: THC, CBD, Flavoring   Substance and Sexual Activity    Alcohol use: Yes     Comment: occ    Drug use: Never    Sexual activity: Yes     Partners: Female        Objective   Ht 6' 2\" (1.88 m)   Wt (!) 150 kg (330 lb)   BMI 42.37 kg/m²      Physical Exam  Vascular status is a faint 1/4 DP PT negative digital hair slightly delayed capillary refill with chronic edema present bilaterally.  The edema is +2 pitting and the capillary refill is approximately 3 seconds.    Derm nails are brittle elongated hypertrophic yellow-brown discoloration with subungual debris x " 10.  There is an increased thickness and the nails are approximately 2 mm in.  There is white flaky tissue in the nonweightbearing areas of the arch but no signs of any fissures or dried blood in the area.  There is loss of turgor noted bilaterally.  The skin is also shiny.    Ortho and neuro are unchanged from his visit on 9/4/2024.  Administrative Statements   I have spent a total time of 15 minutes in caring for this patient on the day of the visit/encounter including Risks and benefits of tx options, Instructions for management, Patient and family education, Importance of tx compliance, Risk factor reductions, Counseling / Coordination of care, and Documenting in the medical record.

## 2025-05-13 ENCOUNTER — OFFICE VISIT (OUTPATIENT)
Dept: NEUROLOGY | Facility: CLINIC | Age: 60
End: 2025-05-13
Payer: COMMERCIAL

## 2025-05-13 VITALS
DIASTOLIC BLOOD PRESSURE: 74 MMHG | TEMPERATURE: 98 F | SYSTOLIC BLOOD PRESSURE: 128 MMHG | OXYGEN SATURATION: 96 % | HEART RATE: 77 BPM

## 2025-05-13 DIAGNOSIS — I67.2 CEREBRAL ATHEROSCLEROSIS: ICD-10-CM

## 2025-05-13 DIAGNOSIS — Z86.73 HISTORY OF STROKE: Primary | ICD-10-CM

## 2025-05-13 DIAGNOSIS — G62.9 NEUROPATHY: ICD-10-CM

## 2025-05-13 PROCEDURE — 99214 OFFICE O/P EST MOD 30 MIN: CPT | Performed by: PHYSICIAN ASSISTANT

## 2025-05-13 RX ORDER — ASPIRIN 81 MG/1
81 TABLET, CHEWABLE ORAL DAILY
COMMUNITY

## 2025-05-13 NOTE — ASSESSMENT & PLAN NOTE
Pt was told by his PCP to be evaluated for neuropathy.  Pt has symptoms in the Lt LE.  His sensation is intact. He will have throbbing pain in the Lt LE which is worsened depending on his activity level during the day.  His symptoms are likely post-stroke pain as opposed to peripheral neuropathy.  An EMG will be ordered.  Orders:    EMG 2 limb lower extremity; Future

## 2025-05-13 NOTE — ASSESSMENT & PLAN NOTE
Pt denies any symptoms to suggest new stroke.  Pt should continue Aspirin and atorvastatin for secondary stroke prevention.  Continue with good blood pressure control; I would recommend monitoring at home at least 3 times per week; Goal of <130/80; at goal  Continue with good cholesterol control; Goal LDL <70; at goal  Continue with good blood sugar control; Goal HgbA1c <7.0; at goal  Pt was encouraged to get regular exercise and follow a Mediterranean diet.  If pt has any new stroke-like symptoms including sudden painless loss of vision or double vision, difficulty speaking or swallowing, vertigo / room spinning that does not quickly resolve, or weakness / numbness affecting 1 side of the face or body he should proceed by ambulance to the nearest emergency room immediately.  Pt will follow-up in 8 months.

## 2025-05-13 NOTE — PROGRESS NOTES
Name: Nas Salgado      : 1965      MRN: [id number]  Encounter Provider: Sheryl Lora PA-C  Encounter Date: 2025   Encounter department: [facility] [place]  :  Assessment & Plan  History of stroke  Pt denies any symptoms to suggest new stroke.  Pt should continue Aspirin and atorvastatin for secondary stroke prevention.  Continue with good blood pressure control; I would recommend monitoring at home at least 3 times per week; Goal of <130/80; at goal  Continue with good cholesterol control; Goal LDL <70; at goal  Continue with good blood sugar control; Goal HgbA1c <7.0; at goal  Pt was encouraged to get regular exercise and follow a Mediterranean diet.  If pt has any new stroke-like symptoms including sudden painless loss of vision or double vision, difficulty speaking or swallowing, vertigo / room spinning that does not quickly resolve, or weakness / numbness affecting 1 side of the face or body he should proceed by ambulance to the nearest emergency room immediately.  Pt will follow-up in 8 months.         Neuropathy  Pt was told by his PCP to be evaluated for neuropathy.  Pt has symptoms in the Lt LE.  His sensation is intact. He will have throbbing pain in the Lt LE which is worsened depending on his activity level during the day.  His symptoms are likely post-stroke pain as opposed to peripheral neuropathy.  An EMG will be ordered.  Orders:    EMG 2 limb lower extremity; Future    Cerebral atherosclerosis  A carotid ultrasound will be ordered for follow-up.  Orders:    VAS carotid complete study; Future          History of Present Illness     Nas Salgado is a 60yo RH male, with HTN, DM type 2, who presents today to establish care with Neurology due to a history of stroke. He has had multiple strokes. Per pt strokes occurred in 2010, 2011, and 2012. His last stroke was in  due to Rt internal carotid artery occlusion. He  was living in [place] when he had the first few strokes. He then moved to [place] with family for 6 years. He is now in [place] with his son who helps the pt with daily activities and drives him where he needs to go. He was attending therapy through [facility]. He completed therapy about 1 week ago. He is now following a home exercise program and goes to the gym. He attends a day program when his son is at work. For mobility, he has a motorized WC that he uses in public and a small and large based quad cane in the house. He is taking ASA, Plavix, and atorvastatin. He has been taking these medications for years. HA1C is at goal. LDL is above goal but he is due for recheck. He denies any symptoms to suggest new stroke. He is due for a carotid ultrasound.    Today he reports a new problem. He states that he has throbbing in the Lt foot. He has been taking Lyrica as needed. He was seen by Podiatry and told that he has neuropathy and needed to see Neurology. He states that the throbbing is always worse at night and when he is lying down. The throbbing is worse in the Lt side compared to the Rt     HA1C 6.4  Lipid panel: . LDL 90.    Review of Systems   Constitutional:  Negative for appetite change, fatigue and fever.   HENT: Negative.  Negative for hearing loss, tinnitus, trouble swallowing and voice change.    Eyes: Negative.  Negative for photophobia, pain and visual disturbance.   Respiratory: Negative.  Negative for shortness of breath.    Cardiovascular: Negative.  Negative for palpitations.   Gastrointestinal: Negative.  Negative for nausea and vomiting.   Endocrine: Negative.  Negative for cold intolerance.   Genitourinary: Negative.  Negative for dysuria, frequency and urgency.   Musculoskeletal:  Negative for back pain, gait problem, myalgias, neck pain and neck stiffness.   Skin: Negative.  Negative for rash.   Allergic/Immunologic: Negative.    Neurological:  Positive for numbness (bilateral feet). Negative for  dizziness, tremors, seizures, syncope, facial asymmetry, speech difficulty, weakness, light-headedness and headaches.   Hematological: Negative.  Does not bruise/bleed easily.   Psychiatric/Behavioral: Negative.  Negative for confusion, hallucinations and sleep disturbance.    All other systems reviewed and are negative.   I have personally reviewed the MA's review of systems and made changes as necessary.    Medical History Reviewed by provider this encounter:  Tobacco  Allergies  Meds  Problems  Med Hx  Surg Hx  Fam Hx     .     Objective   /74 (BP Location: Right arm, Patient Position: Sitting, Cuff Size: Adult)   Pulse 77   Temp 98 °F (36.7 °C) (Temporal)   SpO2 96%     Physical Exam  Vitals reviewed.   Constitutional:       Appearance: Normal appearance. He is obese.   HENT:      Head: Normocephalic and atraumatic.      Nose: Nose normal.      Mouth/Throat:      Mouth: Mucous membranes are moist.      Pharynx: Oropharynx is clear.     Eyes:      General: Lids are normal.      Extraocular Movements: Extraocular movements intact.      Conjunctiva/sclera: Conjunctivae normal.      Pupils: Pupils are equal, round, and reactive to light.     Pulmonary:      Effort: Pulmonary effort is normal.     Neurological:      Mental Status: He is alert.      Deep Tendon Reflexes:      Reflex Scores:       Tricep reflexes are 3+ on the left side.       Bicep reflexes are 3+ on the left side.       Brachioradialis reflexes are 3+ on the left side.       Patellar reflexes are 3+ on the left side.    Psychiatric:         Speech: Speech normal.       Neurological Exam  Mental Status  Alert. Oriented to person, place, time and situation. Recent and remote memory are intact. Speech is normal. Language is fluent with no aphasia. Attention and concentration are normal. Fund of knowledge is appropriate for level of education. Apraxia absent.    Cranial Nerves  CN III, IV, VI: Extraocular movements intact bilaterally.  Normal lids and orbits bilaterally. Pupils equal round and reactive to light bilaterally.  CN V: Facial sensation is normal.  CN VII: Full and symmetric facial movement.  CN IX, X: Palate elevates symmetrically  CN XI: Shoulder shrug strength is normal.  CN XII: Tongue midline without atrophy or fasciculations.    Motor   Strength is 5/5 in all four extremities except as noted.  4/5 Lt UE and LE.    Sensory  Sensation is intact to light touch, pinprick, vibration and proprioception in all four extremities.    Reflexes  Deep tendon reflexes are 2+ and symmetric except as noted.                                            Right                      Left  Brachioradialis                                            3+  Biceps                                                         3+  Triceps                                                        3+  Hamstring                                                    3+  Patellar                                                        3+    Gait    Wheelchair.      Radiology Results Review : No pertinent imaging studies reviewed.    Administrative Statements   I have spent a total time of 30 minutes in caring for this patient on the day of the visit/encounter including Prognosis, Risks and benefits of tx options, Instructions for management, Patient and family education, Importance of tx compliance, Risk factor reductions, Impressions, Counseling / Coordination of care, Documenting in the medical record, Reviewing/placing orders in the medical record (including tests, medications, and/or procedures), and Obtaining or reviewing history  .

## 2025-05-13 NOTE — PATIENT INSTRUCTIONS
Neuropathy  Pt was told by his PCP to be evaluated for neuropathy.  Pt has symptoms in the Lt LE.  His sensation is intact. He will have throbbing pain in the Lt LE which is worsened depending on his activity level during the day.  His symptoms are likely post-stroke pain as opposed to peripheral neuropathy.  An EMG will be ordered.  Orders:    EMG 2 limb lower extremity; Future      History of stroke  Pt denies any symptoms to suggest new stroke.  Pt should continue Aspirin and atorvastatin for secondary stroke prevention.  Continue with good blood pressure control; I would recommend monitoring at home at least 3 times per week; Goal of <130/80; at goal  Continue with good cholesterol control; Goal LDL <70; at goal  Continue with good blood sugar control; Goal HgbA1c <7.0; at goal  Pt was encouraged to get regular exercise and follow a Mediterranean diet.  If pt has any new stroke-like symptoms including sudden painless loss of vision or double vision, difficulty speaking or swallowing, vertigo / room spinning that does not quickly resolve, or weakness / numbness affecting 1 side of the face or body he should proceed by ambulance to the nearest emergency room immediately.  Pt will follow-up in 8 months.

## 2025-05-28 ENCOUNTER — PROCEDURE VISIT (OUTPATIENT)
Dept: PODIATRY | Facility: CLINIC | Age: 60
End: 2025-05-28
Payer: COMMERCIAL

## 2025-05-28 VITALS — HEIGHT: 74 IN | WEIGHT: 315 LBS | BODY MASS INDEX: 40.43 KG/M2

## 2025-05-28 DIAGNOSIS — M79.675 PAIN IN TOES OF BOTH FEET: ICD-10-CM

## 2025-05-28 DIAGNOSIS — B35.1 ONYCHOMYCOSIS: ICD-10-CM

## 2025-05-28 DIAGNOSIS — L85.3 XEROSIS OF SKIN: Primary | ICD-10-CM

## 2025-05-28 DIAGNOSIS — R60.1 GENERALIZED EDEMA: ICD-10-CM

## 2025-05-28 DIAGNOSIS — M79.674 PAIN IN TOES OF BOTH FEET: ICD-10-CM

## 2025-05-28 DIAGNOSIS — I73.9 PAD (PERIPHERAL ARTERY DISEASE) (HCC): ICD-10-CM

## 2025-05-28 DIAGNOSIS — E11.59 CONTROLLED TYPE 2 DIABETES MELLITUS WITH OTHER CIRCULATORY COMPLICATION, WITHOUT LONG-TERM CURRENT USE OF INSULIN (HCC): ICD-10-CM

## 2025-05-28 PROCEDURE — 99212 OFFICE O/P EST SF 10 MIN: CPT | Performed by: PODIATRIST

## 2025-05-28 PROCEDURE — 11721 DEBRIDE NAIL 6 OR MORE: CPT | Performed by: PODIATRIST

## 2025-05-28 RX ORDER — CLOPIDOGREL BISULFATE 75 MG/1
75 TABLET ORAL DAILY
COMMUNITY

## 2025-05-28 RX ORDER — INSULIN GLARGINE 100 [IU]/ML
20 INJECTION, SOLUTION SUBCUTANEOUS DAILY
COMMUNITY

## 2025-05-28 RX ORDER — DULAGLUTIDE 3 MG/.5ML
INJECTION, SOLUTION SUBCUTANEOUS
COMMUNITY
Start: 2025-05-26

## 2025-05-28 RX ORDER — CLONIDINE HYDROCHLORIDE 0.1 MG/1
TABLET ORAL
COMMUNITY
Start: 2025-03-13

## 2025-05-28 RX ORDER — BLOOD SUGAR DIAGNOSTIC
STRIP MISCELLANEOUS
COMMUNITY
Start: 2025-05-15

## 2025-05-28 RX ORDER — AMMONIUM LACTATE 12 G/100G
LOTION TOPICAL 2 TIMES DAILY
Qty: 225 G | Refills: 3 | Status: SHIPPED | OUTPATIENT
Start: 2025-05-28 | End: 2025-11-24

## 2025-05-28 RX ORDER — PEN NEEDLE, DIABETIC 32GX 5/32"
NEEDLE, DISPOSABLE MISCELLANEOUS
COMMUNITY
Start: 2025-05-18

## 2025-05-28 RX ORDER — CETIRIZINE HYDROCHLORIDE 10 MG/1
10 TABLET ORAL DAILY PRN
COMMUNITY
Start: 2025-03-10

## 2025-05-28 RX ORDER — UBIQUINOL 100 MG
CAPSULE ORAL
COMMUNITY
Start: 2025-05-17

## 2025-05-28 NOTE — PROGRESS NOTES
Name: Nas Salgado      : 1965      MRN: [id number]  Encounter Provider: Osiel Albarado DPM  Encounter Date: 2025   Encounter department: [facility] [place]  :  Assessment & Plan  Onychomycosis       Debride mycotic nails and thin the nail plates x 10 with the use of a nail nipper manually and an electric Dremel bur was used to reduce the thickness of the nail beds and smoothed the distal aspect of the nails.   PAD (peripheral artery disease) ([facility])    Orders:    ammonium lactate (LAC-HYDRIN) 12 % lotion; Apply topically 2 (two) times a day    Controlled type 2 diabetes mellitus with other circulatory complication, without long-term current use of insulin ([facility])    Lab Results   Component Value Date    HGBA1C 6.4 (H) 2023       Orders:    ammonium lactate (LAC-HYDRIN) 12 % lotion; Apply topically 2 (two) times a day    Xerosis of skin    Orders:    ammonium lactate (LAC-HYDRIN) 12 % lotion; Apply topically 2 (two) times a day    Generalized edema    Orders:    ammonium lactate (LAC-HYDRIN) 12 % lotion; Apply topically 2 (two) times a day    Pain in toes of both feet         Discussed proper shoe gear, daily inspections of feet, and general foot health with patient. Patient has Q8  findings and is recommended for at risk foot care every 9-10 weeks.    Patients most recent complete clinical foot exam was on: 2025    Return in about 10 weeks (around 2025).     History of Present Illness   HPI  Nas Salgado is a 60 y.o. male who presents an A1c of 6.4 drawn on 2023.  With chief complaint of painful thick nails on both feet.  He is a diabetic with with his most recent glucose being 117 at his physician's office dated 3/14/2025.  He was seen today in his motorized wheelchair.  History obtained from: patient    Review of Systems  Medical History Reviewed by provider this encounter:     .  Medications Ordered Prior to Encounter[1]   Social History[2]     Objective  "  Ht 6' 2\" (1.88 m) Comment: verbal  Wt (!) 150 kg (330 lb) Comment: verbal  BMI 42.37 kg/m²      Physical Exam  Vascular status is a faint 1/4 DP PT negative digital hair, normal distal cooling, and slightly delayed capillary refill with chronic edema present bilaterally.  The edema is +2 pitting and the capillary refill is approximately 3 seconds.     Derm nails are brittle elongated hypertrophic yellow-brown discoloration with subungual debris x 10.  There is an increased thickness in the nails of approximately 2 mm.  The white flaky tissue in the nonweightbearing areas of the arch is significantly improved.  No signs of any fissures or dried blood in the area.  There is loss of turgor noted bilaterally.  The skin is also shiny.    Ortho pes planus is noted bilaterally minor hammertoe deformities are present on the fifth digits bilaterally.       [1]   Current Outpatient Medications on File Prior to Visit   Medication Sig Dispense Refill    Accu-Chek Guide Test test strip TEST 4 TIMES A DAY AS DIRECTED      acetaminophen (TYLENOL) 325 mg tablet Take 2 tablets (650 mg total) by mouth every 4 (four) hours as needed for mild pain  0    Alcohol Swabs (Alcohol Prep) 70 % PADS USE AS DIRECTED WITH LANCETS&TEST STRIPS      amLODIPine-benazepril (LOTREL 5-20) 5-20 MG per capsule TAKE 1 CAPSULE BY ORAL ROUTE ONCE DAILY      aspirin 81 mg chewable tablet Chew 81 mg daily      atorvastatin (LIPITOR) 40 mg tablet Take 40 mg by mouth daily      Blood Glucose Monitoring Suppl (ONE TOUCH ULTRA 2) w/Device KIT TEST BLOOD SUGAR LEVELS ONE TIME A DAY      cetirizine (ZyrTEC Allergy) 10 mg tablet Take 10 mg by mouth daily as needed      cloNIDine (CATAPRES) 0.1 mg tablet       clopidogrel (PLAVIX) 75 mg tablet Take 75 mg by mouth daily      Easy Comfort Pen Needles 31G X 6 MM MISC USE WITH INSULIN  each 0    ketoconazole (NIZORAL) 2 % cream Apply topically daily 60 g 1    Lantus SoloStar 100 units/mL SOPN Inject 20 Units " under the skin daily      NON FORMULARY Naloxone take home nasal spray      pregabalin (LYRICA) 75 mg capsule TAKE 1 CAPSULE 2 TIMES PER DAY      Pro Comfort Lancets 31G MISC USE AS DIRECTED. 100 LANCETS      Trulicity 3 MG/0.5ML SOAJ INJECT 0.5 ML EVERY WEEK BY SUBCUTANEOUS ROUTE FOR 90 DAYS.      [DISCONTINUED] ammonium lactate (LAC-HYDRIN) 12 % cream APPLY TOPICALLY 2 (TWO) TIMES A DAY AS NEEDED FOR DRY SKIN 385 g 3    docusate sodium (COLACE) 100 mg capsule Take 1 capsule (100 mg total) by mouth 2 (two) times a day for 5 days (Patient not taking: Reported on 5/27/2025) 10 capsule 0    Trulicity 0.75 MG/0.5ML SOPN INJECT 0.5 ML (0.75 MG TOTAL) UNDER THE SKIN ONCE A WEEK (Patient not taking: Reported on 5/28/2025) 2 mL 0     No current facility-administered medications on file prior to visit.   [2]   Social History  Tobacco Use    Smoking status: Some Days     Types: Cigars    Smokeless tobacco: Never    Tobacco comments:     cigar   Vaping Use    Vaping status: Some Days    Substances: THC, CBD, Flavoring   Substance and Sexual Activity    Alcohol use: Yes     Comment: occ    Drug use: Never    Sexual activity: Yes     Partners: Female

## 2025-05-29 ENCOUNTER — HOSPITAL ENCOUNTER (OUTPATIENT)
Dept: NEUROLOGY | Facility: CLINIC | Age: 60
Discharge: HOME | End: 2025-05-29
Attending: PHYSICIAN ASSISTANT
Payer: COMMERCIAL

## 2025-05-29 DIAGNOSIS — G62.9 NEUROPATHY: ICD-10-CM

## 2025-05-29 PROCEDURE — 95910 NRV CNDJ TEST 7-8 STUDIES: CPT | Performed by: PSYCHIATRY & NEUROLOGY

## 2025-05-29 PROCEDURE — 95886 MUSC TEST DONE W/N TEST COMP: CPT | Performed by: PSYCHIATRY & NEUROLOGY

## (undated) DEVICE — DRAPE C-ARM X-RAY

## (undated) DEVICE — ADHESIVE SKIN HIGH VISCOSITY EXOFIN 1ML

## (undated) DEVICE — LIGAMAX 5 MM ENDOSCOPIC MULTIPLE CLIP APPLIER: Brand: LIGAMAX

## (undated) DEVICE — SUT MONOCRYL 4-0 PS-2 18 IN Y496G

## (undated) DEVICE — Device: Brand: MEDEX

## (undated) DEVICE — CHLORAPREP HI-LITE 26ML ORANGE

## (undated) DEVICE — Device: Brand: OMNICLOSE TROCAR SITE CLOSURE DEVICE

## (undated) DEVICE — ELECTRODE LAP L WIRE E-Z CLEAN 33CM -0100

## (undated) DEVICE — SYRINGE 10ML LL

## (undated) DEVICE — TISSUE RETRIEVAL SYSTEM: Brand: INZII RETRIEVAL SYSTEM

## (undated) DEVICE — TUBING SMOKE EVAC W/FILTRATION DEVICE PLUMEPORT ACTIV

## (undated) DEVICE — SUT VICRYL PLUS 0 UR-6 27IN VCP603H

## (undated) DEVICE — CATHETER KUMAR

## (undated) DEVICE — TROCAR: Brand: KII® SLEEVE

## (undated) DEVICE — SPECIMEN CONTAINER STERILE PEEL PACK

## (undated) DEVICE — TROCAR: Brand: KII FIOS FIRST ENTRY

## (undated) DEVICE — GLOVE SRG BIOGEL 7

## (undated) DEVICE — ANTIBACTERIAL UNDYED BRAIDED (POLYGLACTIN 910), SYNTHETIC ABSORBABLE SUTURE: Brand: COATED VICRYL

## (undated) DEVICE — PACK PBDS LAP CHOLE RF

## (undated) DEVICE — SYRINGE 20ML LL

## (undated) DEVICE — INSUFFLATION NEEDLE TO ESTABLISH PNEUMOPERITONEUM.: Brand: INSUFFLATION NEEDLE

## (undated) DEVICE — INTENDED FOR TISSUE SEPARATION, AND OTHER PROCEDURES THAT REQUIRE A SHARP SURGICAL BLADE TO PUNCTURE OR CUT.: Brand: BARD-PARKER SAFETY BLADES SIZE 15, STERILE

## (undated) DEVICE — STOPCOCK 4-WAY

## (undated) DEVICE — NEEDLE 25G X 1 1/2

## (undated) DEVICE — GLOVE INDICATOR PI UNDERGLOVE SZ 7.5 BLUE

## (undated) DEVICE — PENCIL ELECTROSURG E-Z CLEAN -0035H